# Patient Record
Sex: FEMALE | Race: WHITE | NOT HISPANIC OR LATINO | Employment: FULL TIME | URBAN - METROPOLITAN AREA
[De-identification: names, ages, dates, MRNs, and addresses within clinical notes are randomized per-mention and may not be internally consistent; named-entity substitution may affect disease eponyms.]

---

## 2018-01-16 RX ORDER — OXYCODONE AND ACETAMINOPHEN 5; 325 MG/1; MG/1
1-2 TABLET ORAL
COMMUNITY
Start: 2016-07-21 | End: 2024-06-30

## 2018-01-16 RX ORDER — ACETAMINOPHEN 500 MG
500-1000 TABLET ORAL
COMMUNITY

## 2018-01-16 RX ORDER — RISPERIDONE 2 MG/1
2 TABLET ORAL
COMMUNITY
Start: 2015-12-31 | End: 2024-06-30

## 2018-01-16 RX ORDER — HYDROXYZINE PAMOATE 25 MG/1
25 CAPSULE ORAL
COMMUNITY
Start: 2016-07-06 | End: 2024-06-30

## 2018-01-16 RX ORDER — ALBUTEROL SULFATE 90 UG/1
2 AEROSOL, METERED RESPIRATORY (INHALATION)
COMMUNITY
Start: 2016-01-12

## 2018-01-16 RX ORDER — LAMOTRIGINE 100 MG/1
100 TABLET ORAL
COMMUNITY
Start: 2016-07-21 | End: 2024-06-30

## 2018-01-16 RX ORDER — SUMATRIPTAN 25 MG/1
50 TABLET, FILM COATED ORAL
COMMUNITY
Start: 2016-07-21 | End: 2024-06-30

## 2018-01-16 RX ORDER — ONDANSETRON 4 MG/1
4 TABLET, ORALLY DISINTEGRATING ORAL
COMMUNITY
Start: 2016-07-16 | End: 2023-10-28

## 2023-10-27 ENCOUNTER — HOSPITAL ENCOUNTER (EMERGENCY)
Facility: CLINIC | Age: 28
Discharge: HOME | End: 2023-10-27
Attending: EMERGENCY MEDICINE | Admitting: EMERGENCY MEDICINE
Payer: COMMERCIAL

## 2023-10-27 VITALS
SYSTOLIC BLOOD PRESSURE: 114 MMHG | HEART RATE: 68 BPM | TEMPERATURE: 98.7 F | RESPIRATION RATE: 22 BRPM | BODY MASS INDEX: 22.2 KG/M2 | HEIGHT: 64 IN | DIASTOLIC BLOOD PRESSURE: 69 MMHG | OXYGEN SATURATION: 98 % | WEIGHT: 130 LBS

## 2023-10-27 DIAGNOSIS — R41.82 ALTERED MENTAL STATUS, UNSPECIFIED ALTERED MENTAL STATUS TYPE: ICD-10-CM

## 2023-10-27 LAB
ALBUMIN SERPL BCG-MCNC: 4.4 G/DL (ref 3.5–5.2)
ALP SERPL-CCNC: 56 U/L (ref 35–104)
ALT SERPL W P-5'-P-CCNC: 14 U/L (ref 0–50)
ANION GAP SERPL CALCULATED.3IONS-SCNC: 10 MMOL/L (ref 7–15)
APAP SERPL-MCNC: <5 UG/ML (ref 10–30)
AST SERPL W P-5'-P-CCNC: 16 U/L (ref 0–45)
ATRIAL RATE - MUSE: 123 BPM
BASOPHILS # BLD AUTO: 0 10E3/UL (ref 0–0.2)
BASOPHILS NFR BLD AUTO: 0 %
BILIRUB SERPL-MCNC: 0.4 MG/DL
BUN SERPL-MCNC: 7.8 MG/DL (ref 6–20)
CALCIUM SERPL-MCNC: 9.2 MG/DL (ref 8.6–10)
CHLORIDE SERPL-SCNC: 105 MMOL/L (ref 98–107)
CREAT SERPL-MCNC: 0.71 MG/DL (ref 0.51–0.95)
DEPRECATED HCO3 PLAS-SCNC: 24 MMOL/L (ref 22–29)
DIASTOLIC BLOOD PRESSURE - MUSE: NORMAL MMHG
EGFRCR SERPLBLD CKD-EPI 2021: >90 ML/MIN/1.73M2
EOSINOPHIL # BLD AUTO: 0.1 10E3/UL (ref 0–0.7)
EOSINOPHIL NFR BLD AUTO: 1 %
ERYTHROCYTE [DISTWIDTH] IN BLOOD BY AUTOMATED COUNT: 12.4 % (ref 10–15)
ETHANOL SERPL-MCNC: <0.01 G/DL
GLUCOSE SERPL-MCNC: 106 MG/DL (ref 70–99)
HCG SERPL QL: NEGATIVE
HCT VFR BLD AUTO: 33.2 % (ref 35–47)
HGB BLD-MCNC: 11.3 G/DL (ref 11.7–15.7)
IMM GRANULOCYTES # BLD: 0 10E3/UL
IMM GRANULOCYTES NFR BLD: 0 %
INTERPRETATION ECG - MUSE: NORMAL
LYMPHOCYTES # BLD AUTO: 2.3 10E3/UL (ref 0.8–5.3)
LYMPHOCYTES NFR BLD AUTO: 29 %
MCH RBC QN AUTO: 30.8 PG (ref 26.5–33)
MCHC RBC AUTO-ENTMCNC: 34 G/DL (ref 31.5–36.5)
MCV RBC AUTO: 91 FL (ref 78–100)
MONOCYTES # BLD AUTO: 0.6 10E3/UL (ref 0–1.3)
MONOCYTES NFR BLD AUTO: 7 %
NEUTROPHILS # BLD AUTO: 4.9 10E3/UL (ref 1.6–8.3)
NEUTROPHILS NFR BLD AUTO: 63 %
NRBC # BLD AUTO: 0 10E3/UL
NRBC BLD AUTO-RTO: 0 /100
P AXIS - MUSE: 59 DEGREES
PLATELET # BLD AUTO: 330 10E3/UL (ref 150–450)
POTASSIUM SERPL-SCNC: 3.8 MMOL/L (ref 3.4–5.3)
PR INTERVAL - MUSE: 156 MS
PROT SERPL-MCNC: 6.9 G/DL (ref 6.4–8.3)
QRS DURATION - MUSE: 78 MS
QT - MUSE: 298 MS
QTC - MUSE: 426 MS
R AXIS - MUSE: 31 DEGREES
RBC # BLD AUTO: 3.67 10E6/UL (ref 3.8–5.2)
SALICYLATES SERPL-MCNC: <0.3 MG/DL
SODIUM SERPL-SCNC: 139 MMOL/L (ref 135–145)
SYSTOLIC BLOOD PRESSURE - MUSE: NORMAL MMHG
T AXIS - MUSE: 47 DEGREES
VENTRICULAR RATE- MUSE: 123 BPM
WBC # BLD AUTO: 7.9 10E3/UL (ref 4–11)

## 2023-10-27 PROCEDURE — 36415 COLL VENOUS BLD VENIPUNCTURE: CPT | Performed by: EMERGENCY MEDICINE

## 2023-10-27 PROCEDURE — 85025 COMPLETE CBC W/AUTO DIFF WBC: CPT | Performed by: EMERGENCY MEDICINE

## 2023-10-27 PROCEDURE — 258N000003 HC RX IP 258 OP 636: Performed by: EMERGENCY MEDICINE

## 2023-10-27 PROCEDURE — 96374 THER/PROPH/DIAG INJ IV PUSH: CPT

## 2023-10-27 PROCEDURE — 80179 DRUG ASSAY SALICYLATE: CPT | Performed by: EMERGENCY MEDICINE

## 2023-10-27 PROCEDURE — 99284 EMERGENCY DEPT VISIT MOD MDM: CPT | Mod: 25

## 2023-10-27 PROCEDURE — 250N000011 HC RX IP 250 OP 636: Performed by: EMERGENCY MEDICINE

## 2023-10-27 PROCEDURE — 80143 DRUG ASSAY ACETAMINOPHEN: CPT | Performed by: EMERGENCY MEDICINE

## 2023-10-27 PROCEDURE — 96361 HYDRATE IV INFUSION ADD-ON: CPT

## 2023-10-27 PROCEDURE — 84703 CHORIONIC GONADOTROPIN ASSAY: CPT | Performed by: EMERGENCY MEDICINE

## 2023-10-27 PROCEDURE — 93005 ELECTROCARDIOGRAM TRACING: CPT

## 2023-10-27 PROCEDURE — 82077 ASSAY SPEC XCP UR&BREATH IA: CPT | Performed by: EMERGENCY MEDICINE

## 2023-10-27 PROCEDURE — 80053 COMPREHEN METABOLIC PANEL: CPT | Performed by: EMERGENCY MEDICINE

## 2023-10-27 RX ORDER — HALOPERIDOL 5 MG/ML
10 INJECTION INTRAMUSCULAR ONCE
Status: DISCONTINUED | OUTPATIENT
Start: 2023-10-27 | End: 2023-10-27

## 2023-10-27 RX ORDER — LORAZEPAM 2 MG/ML
1 INJECTION INTRAMUSCULAR ONCE
Status: COMPLETED | OUTPATIENT
Start: 2023-10-27 | End: 2023-10-27

## 2023-10-27 RX ADMIN — LORAZEPAM 1 MG: 2 INJECTION INTRAMUSCULAR; INTRAVENOUS at 02:40

## 2023-10-27 RX ADMIN — SODIUM CHLORIDE 1000 ML: 9 INJECTION, SOLUTION INTRAVENOUS at 05:27

## 2023-10-27 RX ADMIN — SODIUM CHLORIDE 1000 ML: 9 INJECTION, SOLUTION INTRAVENOUS at 02:37

## 2023-10-27 ASSESSMENT — ACTIVITIES OF DAILY LIVING (ADL)
ADLS_ACUITY_SCORE: 37
ADLS_ACUITY_SCORE: 37
ADLS_ACUITY_SCORE: 35
ADLS_ACUITY_SCORE: 37

## 2023-10-27 NOTE — ED TRIAGE NOTES
Patient states ate a really large marijuana cookie at 930pm.  States he made it really strong.  Also had 1000 mg of caffeine today.   States speech is slurred, left eye is droopy, neck & back hurts.  Feels like symptoms are coming on in waves.

## 2023-10-27 NOTE — ED PROVIDER NOTES
Discussed patient's presentation with her and with her boyfriend at the bedside.  Patient specifically denying any suicidal ideation or homicidal ideation, states that he does not have any active psychoses, and feels comfortable going home.  She states that she feels somewhat tired from the marijuana cookie, but also states that she feels comfortable at home.  No indication for mental assessment this time, patient is politely declined this anyway, and says that she does not feel she needs additional mental health resources in the outpatient setting.     Darshan Lewis MD  10/27/23 0943

## 2023-10-27 NOTE — ED PROVIDER NOTES
"  History     Chief Complaint:  Drug / Alcohol Assessment and Ingestion       HPI   Jenniffer Young is a 28 year old female with a history of marijuana dependence, anxiety, and depression who presents for a drug assessment. She states she ate a large marijuana cookie at 2130 tonight with her boyfriend, and also had 1000 mg of caffeine today to stay awake. She then began to feel anxious and complains of neck pain and feeling out of it. She lives with her mother and woke her up due to feeling this way.    Independent Historian:   None - Patient Only        Medications:    Albuterol  Vistaril  Lamictal  Metformin  Risperdal  Imitrex    Past Medical History:    Anxiety  Sepsis  Depression  Bipolar disorder  Psychosis  Marijuana dependence    Past Surgical History:    EGD  Tonsillectomy     Physical Exam   Patient Vitals for the past 24 hrs:   BP Temp Pulse Resp SpO2 Height Weight   10/27/23 0604 114/69 -- 68 22 98 % -- --   10/27/23 0534 101/61 -- 76 17 96 % -- --   10/27/23 0454 105/54 -- 89 22 95 % -- --   10/27/23 0300 -- -- 96 23 95 % -- --   10/27/23 0257 108/59 -- 101 23 94 % -- --   10/27/23 0242 126/70 -- 107 26 95 % -- --   10/27/23 0227 122/72 -- 116 21 96 % -- --   10/27/23 0142 135/82 98.7  F (37.1  C) (!) 141 16 97 % 1.626 m (5' 4\") 59 kg (130 lb)        Physical Exam  VS: Reviewed per above  HENT: Mucous membranes moist, no nuchal rigidity  EYES: sclera anicteric  CV: Rate as noted,  regular rhythm.   RESP: Effort normal. Breath sounds are normal bilaterally.  GI: no tenderness/rebound/guarding, not distended.  NEURO: GCS 15, moving all extremities equally  PSYCH: tearful at times, sleepy  MSK: No deformity of the extremities  SKIN: Warm and dry    Emergency Department Course   ECG  ECG taken at 0149, ECG read at 0157  Sinus tachycardia  Possible left atrial enlargement  Septal infarct, age undetermined  Abnormal ECG   No EKGs for comparison  Rate 123 bpm. HI interval 156 ms. QRS duration 78 ms. QT/QTc " 298/426 ms. P-R-T axes 59 31 47.     Laboratory:  Labs Ordered and Resulted from Time of ED Arrival to Time of ED Departure   COMPREHENSIVE METABOLIC PANEL - Abnormal       Result Value    Sodium 139      Potassium 3.8      Carbon Dioxide (CO2) 24      Anion Gap 10      Urea Nitrogen 7.8      Creatinine 0.71      GFR Estimate >90      Calcium 9.2      Chloride 105      Glucose 106 (*)     Alkaline Phosphatase 56      AST 16      ALT 14      Protein Total 6.9      Albumin 4.4      Bilirubin Total 0.4     ACETAMINOPHEN LEVEL - Abnormal    Acetaminophen <5.0 (*)    CBC WITH PLATELETS AND DIFFERENTIAL - Abnormal    WBC Count 7.9      RBC Count 3.67 (*)     Hemoglobin 11.3 (*)     Hematocrit 33.2 (*)     MCV 91      MCH 30.8      MCHC 34.0      RDW 12.4      Platelet Count 330      % Neutrophils 63      % Lymphocytes 29      % Monocytes 7      % Eosinophils 1      % Basophils 0      % Immature Granulocytes 0      NRBCs per 100 WBC 0      Absolute Neutrophils 4.9      Absolute Lymphocytes 2.3      Absolute Monocytes 0.6      Absolute Eosinophils 0.1      Absolute Basophils 0.0      Absolute Immature Granulocytes 0.0      Absolute NRBCs 0.0     SALICYLATE LEVEL - Normal    Salicylate <0.3     ETHYL ALCOHOL LEVEL - Normal    Alcohol ethyl <0.01     HCG QUALITATIVE PREGNANCY - Normal    hCG Serum Qualitative Negative          Emergency Department Course & Assessments:    Interventions:  Medications   LORazepam (ATIVAN) injection 1 mg (1 mg Intravenous $Given 10/27/23 0240)   sodium chloride 0.9% BOLUS 1,000 mL (0 mLs Intravenous Stopped 10/27/23 0344)   sodium chloride 0.9% BOLUS 1,000 mL (1,000 mLs Intravenous $New Bag 10/27/23 0527)        Assessments:  0211 I obtained history and examined the patient, as noted above.  0345 I rechecked and updated the patient.  0520 I rechecked and updated the patient. She is still tachycardic.    Independent Interpretation (X-rays, CTs, rhythm strip):  None    Consultations/Discussion of  Management or Tests:  None        Social Determinants of Health affecting care:   Stress/Adjustment Disorders    Disposition:  Care of the patient was transferred to my colleague Dr. Lewis pending reevaluation.     Impression & Plan      Medical Decision Making:  Patient presents to the ER for evaluation of altered mental status in the setting of THC cookie and significant caffeine ingestion.  On arrival she is tachycardic.  She is awake and alert without focal neuro deficit but is  sleepy and intermittently tearful.  Overdose labs are reassuring.  This juncture, suspect alteration in mental status is related to substance ingestion rather than other occult infectious or sinister metabolic or intracranial pathology.  At signout to my colleague, patient was still clearing with plans for reassessment later in the morning to determine need for any further evaluation or mental health assessment.    Diagnosis:    ICD-10-CM    1. Altered mental status, unspecified altered mental status type  R41.82            Discharge Medications:  New Prescriptions    No medications on file          Scribe Disclosure:  Regulo WALKER, am serving as a scribe at 2:15 AM on 10/27/2023 to document services personally performed by Harry Foreman MD based on my observations and the provider's statements to me.   10/27/2023   Harry Foreman MD Lindenbaum, Elan, MD  10/27/23 0710       Harry Foreman MD  10/27/23 0726

## 2023-10-28 ENCOUNTER — HOSPITAL ENCOUNTER (EMERGENCY)
Facility: CLINIC | Age: 28
Discharge: HOME | End: 2023-10-28
Attending: EMERGENCY MEDICINE | Admitting: EMERGENCY MEDICINE
Payer: COMMERCIAL

## 2023-10-28 VITALS
DIASTOLIC BLOOD PRESSURE: 82 MMHG | SYSTOLIC BLOOD PRESSURE: 125 MMHG | TEMPERATURE: 98.2 F | HEART RATE: 78 BPM | OXYGEN SATURATION: 98 % | RESPIRATION RATE: 16 BRPM

## 2023-10-28 DIAGNOSIS — R11.2 NAUSEA AND VOMITING, UNSPECIFIED VOMITING TYPE: ICD-10-CM

## 2023-10-28 DIAGNOSIS — R51.9 ACUTE NONINTRACTABLE HEADACHE, UNSPECIFIED HEADACHE TYPE: ICD-10-CM

## 2023-10-28 DIAGNOSIS — F12.90 MARIJUANA USE: ICD-10-CM

## 2023-10-28 LAB
ANION GAP SERPL CALCULATED.3IONS-SCNC: 9 MMOL/L (ref 7–15)
BASOPHILS # BLD AUTO: 0 10E3/UL (ref 0–0.2)
BASOPHILS NFR BLD AUTO: 0 %
BUN SERPL-MCNC: 6.8 MG/DL (ref 6–20)
CALCIUM SERPL-MCNC: 8.8 MG/DL (ref 8.6–10)
CHLORIDE SERPL-SCNC: 107 MMOL/L (ref 98–107)
CREAT SERPL-MCNC: 0.64 MG/DL (ref 0.51–0.95)
DEPRECATED HCO3 PLAS-SCNC: 24 MMOL/L (ref 22–29)
EGFRCR SERPLBLD CKD-EPI 2021: >90 ML/MIN/1.73M2
EOSINOPHIL # BLD AUTO: 0 10E3/UL (ref 0–0.7)
EOSINOPHIL NFR BLD AUTO: 0 %
ERYTHROCYTE [DISTWIDTH] IN BLOOD BY AUTOMATED COUNT: 12.7 % (ref 10–15)
GLUCOSE SERPL-MCNC: 92 MG/DL (ref 70–99)
HCG SERPL QL: NEGATIVE
HCT VFR BLD AUTO: 34.3 % (ref 35–47)
HGB BLD-MCNC: 11.4 G/DL (ref 11.7–15.7)
IMM GRANULOCYTES # BLD: 0 10E3/UL
IMM GRANULOCYTES NFR BLD: 0 %
LYMPHOCYTES # BLD AUTO: 1.5 10E3/UL (ref 0.8–5.3)
LYMPHOCYTES NFR BLD AUTO: 27 %
MCH RBC QN AUTO: 30.8 PG (ref 26.5–33)
MCHC RBC AUTO-ENTMCNC: 33.2 G/DL (ref 31.5–36.5)
MCV RBC AUTO: 93 FL (ref 78–100)
MONOCYTES # BLD AUTO: 0.3 10E3/UL (ref 0–1.3)
MONOCYTES NFR BLD AUTO: 5 %
NEUTROPHILS # BLD AUTO: 3.8 10E3/UL (ref 1.6–8.3)
NEUTROPHILS NFR BLD AUTO: 68 %
NRBC # BLD AUTO: 0 10E3/UL
NRBC BLD AUTO-RTO: 0 /100
PLATELET # BLD AUTO: 306 10E3/UL (ref 150–450)
POTASSIUM SERPL-SCNC: 4.2 MMOL/L (ref 3.4–5.3)
RBC # BLD AUTO: 3.7 10E6/UL (ref 3.8–5.2)
SODIUM SERPL-SCNC: 140 MMOL/L (ref 135–145)
WBC # BLD AUTO: 5.6 10E3/UL (ref 4–11)

## 2023-10-28 PROCEDURE — 85025 COMPLETE CBC W/AUTO DIFF WBC: CPT | Performed by: EMERGENCY MEDICINE

## 2023-10-28 PROCEDURE — 96376 TX/PRO/DX INJ SAME DRUG ADON: CPT

## 2023-10-28 PROCEDURE — 96374 THER/PROPH/DIAG INJ IV PUSH: CPT

## 2023-10-28 PROCEDURE — 250N000011 HC RX IP 250 OP 636: Mod: JZ | Performed by: EMERGENCY MEDICINE

## 2023-10-28 PROCEDURE — 84703 CHORIONIC GONADOTROPIN ASSAY: CPT | Performed by: EMERGENCY MEDICINE

## 2023-10-28 PROCEDURE — 80048 BASIC METABOLIC PNL TOTAL CA: CPT | Performed by: EMERGENCY MEDICINE

## 2023-10-28 PROCEDURE — 96375 TX/PRO/DX INJ NEW DRUG ADDON: CPT

## 2023-10-28 PROCEDURE — 258N000003 HC RX IP 258 OP 636: Performed by: EMERGENCY MEDICINE

## 2023-10-28 PROCEDURE — 96361 HYDRATE IV INFUSION ADD-ON: CPT

## 2023-10-28 PROCEDURE — 36415 COLL VENOUS BLD VENIPUNCTURE: CPT | Performed by: EMERGENCY MEDICINE

## 2023-10-28 PROCEDURE — 99284 EMERGENCY DEPT VISIT MOD MDM: CPT | Mod: 25

## 2023-10-28 RX ORDER — ONDANSETRON 2 MG/ML
4 INJECTION INTRAMUSCULAR; INTRAVENOUS ONCE
Status: COMPLETED | OUTPATIENT
Start: 2023-10-28 | End: 2023-10-28

## 2023-10-28 RX ORDER — KETOROLAC TROMETHAMINE 15 MG/ML
15 INJECTION, SOLUTION INTRAMUSCULAR; INTRAVENOUS ONCE
Status: COMPLETED | OUTPATIENT
Start: 2023-10-28 | End: 2023-10-28

## 2023-10-28 RX ORDER — ONDANSETRON 4 MG/1
4 TABLET, ORALLY DISINTEGRATING ORAL EVERY 8 HOURS PRN
Qty: 10 TABLET | Refills: 0 | Status: SHIPPED | OUTPATIENT
Start: 2023-10-28 | End: 2024-08-29

## 2023-10-28 RX ADMIN — ONDANSETRON 4 MG: 2 INJECTION INTRAMUSCULAR; INTRAVENOUS at 17:04

## 2023-10-28 RX ADMIN — ONDANSETRON 4 MG: 2 INJECTION INTRAMUSCULAR; INTRAVENOUS at 13:56

## 2023-10-28 RX ADMIN — SODIUM CHLORIDE 1000 ML: 9 INJECTION, SOLUTION INTRAVENOUS at 13:56

## 2023-10-28 RX ADMIN — KETOROLAC TROMETHAMINE 15 MG: 15 INJECTION, SOLUTION INTRAMUSCULAR; INTRAVENOUS at 17:04

## 2023-10-28 ASSESSMENT — ACTIVITIES OF DAILY LIVING (ADL): ADLS_ACUITY_SCORE: 33

## 2023-10-28 NOTE — ED PROVIDER NOTES
"  History     Chief Complaint:  Vomiting and Addiction Problem       HPI   Jenniffer Young is a 28 year old female who presents with ongoing symptoms of marijuana toxicity.  She was seen by my colleague Dr. Lewis yesterday after she had consumed a large cookie that had a extensive amount of marijuana in it.  She had typical symptoms of paranoia and tachycardia.  However, her labs are reassuring and she was discharged home with a diagnosis of marijuana intoxication.    The patient states that she is feeling less euphoric today.  However, she now has more of a headache and nausea.  She also notes that she has had some intermittent tingling to her arms and face and via research through [employer], she has become concerned that she may have suffered a stroke during this.  Unfortunately, there was a long wait to be seen in the emergency department today.  She had labs drawn by the triage team and Zofran was given.  By the time I saw her, she notes that her symptoms are mostly improving.  She describes an ongoing mild to moderate headache, ongoing nausea without vomiting, and generalized weakness.      Independent Historian:   None - Patient Only    Review of External Notes:   Yes-I reviewed her visit to this emergency department yesterday.      Medications:    ondansetron (ZOFRAN ODT) 4 MG ODT tab  acetaminophen (TYLENOL) 500 MG tablet  albuterol (PROAIR HFA/PROVENTIL HFA/VENTOLIN HFA) 108 (90 BASE) MCG/ACT Inhaler  hydrOXYzine (VISTARIL) 25 MG capsule  lamoTRIgine (LAMICTAL) 100 MG tablet  metFORMIN (GLUCOPHAGE) 1000 MG tablet  oxyCODONE-acetaminophen (PERCOCET) 5-325 MG per tablet  risperiDONE (RISPERDAL) 2 MG tablet  SUMAtriptan (IMITREX) 25 MG tablet        Past Medical History:    Past Medical History:   Diagnosis Date    Anxiety disorder        Past Surgical History:    Past Surgical History:   Procedure Laterality Date    ESOPHAGOSCOPY, GASTROSCOPY, DUODENOSCOPY (EGD), COMBINED      2010 - found \"bile\" in " stomach.    OTHER SURGICAL HISTORY      [id number],NO PREVIOUS SURGERY    OTHER SURGICAL HISTORY      12/14/2015,27921.0,IA LAP DIAGNOSTIC ABDOMEN    TONSILLECTOMY      No Comments Provided        Physical Exam   Patient Vitals for the past 24 hrs:   BP Temp Temp src Pulse Resp SpO2   10/28/23 1339 126/70 98.2  F (36.8  C) Temporal 77 18 98 %        Physical Exam  Eye:  Pupils are equal, round, and reactive.  Extraocular movements intact.    ENT:  No rhinorrhea.  Moist mucus membranes.  Normal tongue and tonsil.    Cardiac:  Regular rate and rhythm.  No murmurs, gallops, or rubs.    Pulmonary:  Clear to auscultation bilaterally.  No wheezes, rales, or rhonchi.    Abdomen:  Positive bowel sounds.  Abdomen is soft and non-distended, without focal tenderness.    Musculoskeletal:  Normal movement of all extremities without evidence for deficit.    Skin:  Warm and dry without rashes.    Neurologic:  CN II - XII intact.  5/5 strength in all extremities.  Normal sensation throughout.  Normal finger to nose and heel to shin.  2+ patellar reflexes.  Normal gait.    Psychiatric:  Normal affect with appropriate interaction with examiner.      Emergency Department Course     Laboratory:  Labs Ordered and Resulted from Time of ED Arrival to Time of ED Departure   CBC WITH PLATELETS AND DIFFERENTIAL - Abnormal       Result Value    WBC Count 5.6      RBC Count 3.70 (*)     Hemoglobin 11.4 (*)     Hematocrit 34.3 (*)     MCV 93      MCH 30.8      MCHC 33.2      RDW 12.7      Platelet Count 306      % Neutrophils 68      % Lymphocytes 27      % Monocytes 5      % Eosinophils 0      % Basophils 0      % Immature Granulocytes 0      NRBCs per 100 WBC 0      Absolute Neutrophils 3.8      Absolute Lymphocytes 1.5      Absolute Monocytes 0.3      Absolute Eosinophils 0.0      Absolute Basophils 0.0      Absolute Immature Granulocytes 0.0      Absolute NRBCs 0.0     BASIC METABOLIC PANEL - Normal    Sodium 140      Potassium 4.2       "Chloride 107      Carbon Dioxide (CO2) 24      Anion Gap 9      Urea Nitrogen 6.8      Creatinine 0.64      GFR Estimate >90      Calcium 8.8      Glucose 92     HCG QUALITATIVE PREGNANCY - Normal    hCG Serum Qualitative Negative            Emergency Department Course & Assessments:      Interventions:  Medications   ondansetron (ZOFRAN) injection 4 mg (4 mg Intravenous $Given 10/28/23 1356)   sodium chloride 0.9% BOLUS 1,000 mL (0 mLs Intravenous Stopped 10/28/23 1536)   ondansetron (ZOFRAN) injection 4 mg (4 mg Intravenous $Given 10/28/23 1704)   ketorolac (TORADOL) injection 15 mg (15 mg Intravenous $Given 10/28/23 1704)      Independent Interpretation (X-rays, CTs, rhythm strip):  None    Consultations/Discussion of Management or Tests:  None        Social Determinants of Health affecting care:   None    Disposition:  The patient was discharged to home.     Impression & Plan        Medical Decision Making:  This young healthy woman presents to us for a repeat visit after a large ingestion of marijuana yesterday.  She was seen by my colleague yesterday with a negative work-up.  Repeat laboratory investigation continues to be normal today.  The patient admits that she was somewhat \"freaked out\" while high on marijuana and was very worried about intermittent tingling in her arms and difficulty with her speech at times.  All of this is since resolved.  She has a normal neurologic exam here.  I see no concerns here for this representing TIA or stroke and advanced imaging is not indicated.  Patient continues to have a mild headache which was treated with Toradol and her nausea was treated with Zofran.  The patient feels reassured and is comfort with the plan for discharge home.  We discussed the dangers of marijuana use and to avoid ingesting large amounts.  She will otherwise follow-up with her primary team next week with immediate return to us for worsening of condition or other emergent concerns.      Diagnosis:   "  ICD-10-CM    1. Acute nonintractable headache, unspecified headache type  R51.9       2. Nausea and vomiting, unspecified vomiting type  R11.2       3. Marijuana use  F12.90            Discharge Medications:  New Prescriptions    ONDANSETRON (ZOFRAN ODT) 4 MG ODT TAB    Take 1 tablet (4 mg) by mouth every 8 hours as needed for nausea          Chad A. Trierweiler, MD  10/28/2023   Trierweiler, Chad A, MD Trierweiler, Chad A, MD  10/28/23 1948

## 2023-10-28 NOTE — ED TRIAGE NOTES
Vomiting and dizziness continues to be ongoing since yesterday.  Was worked up yesterday for same.  No focal deficits on arrival.  Ambulated into triage.        Triage Assessment (Adult)       Row Name 10/28/23 1222          Triage Assessment    Airway WDL WDL        Respiratory WDL    Respiratory WDL WDL        Skin Circulation/Temperature WDL    Skin Circulation/Temperature WDL WDL        Cardiac WDL    Cardiac WDL WDL        Peripheral/Neurovascular WDL    Peripheral Neurovascular WDL WDL        Cognitive/Neuro/Behavioral WDL    Cognitive/Neuro/Behavioral WDL WDL

## 2023-10-28 NOTE — ED TRIAGE NOTES
Pt presents to ED with weakness, headache, dizziness, and nausea.  Pt recently seen at [facility] ED for altered mental status secondary to cannabis use.  Pt reports cannabis use since discharge.     Triage Assessment (Adult)       Row Name 10/28/23 1340 10/28/23 1222       Triage Assessment    Airway WDL WDL WDL       Respiratory WDL    Respiratory WDL WDL WDL       Skin Circulation/Temperature WDL    Skin Circulation/Temperature WDL WDL WDL       Cardiac WDL    Cardiac WDL WDL WDL       Peripheral/Neurovascular WDL    Peripheral Neurovascular WDL WDL WDL       Cognitive/Neuro/Behavioral WDL    Cognitive/Neuro/Behavioral WDL WDL WDL

## 2024-05-30 ENCOUNTER — ANCILLARY PROCEDURE (OUTPATIENT)
Dept: GENERAL RADIOLOGY | Facility: CLINIC | Age: 29
End: 2024-05-30
Attending: PHYSICIAN ASSISTANT
Payer: COMMERCIAL

## 2024-05-30 ENCOUNTER — OFFICE VISIT (OUTPATIENT)
Dept: URGENT CARE | Facility: URGENT CARE | Age: 29
End: 2024-05-30
Payer: COMMERCIAL

## 2024-05-30 VITALS
DIASTOLIC BLOOD PRESSURE: 73 MMHG | SYSTOLIC BLOOD PRESSURE: 96 MMHG | HEART RATE: 77 BPM | OXYGEN SATURATION: 100 % | RESPIRATION RATE: 16 BRPM | TEMPERATURE: 97.7 F

## 2024-05-30 DIAGNOSIS — J45.901 EXACERBATION OF REACTIVE AIRWAY DISEASE, UNSPECIFIED ASTHMA SEVERITY, UNSPECIFIED WHETHER PERSISTENT: Primary | ICD-10-CM

## 2024-05-30 DIAGNOSIS — J45.901 EXACERBATION OF REACTIVE AIRWAY DISEASE, UNSPECIFIED ASTHMA SEVERITY, UNSPECIFIED WHETHER PERSISTENT: ICD-10-CM

## 2024-05-30 DIAGNOSIS — R06.02 SOB (SHORTNESS OF BREATH): ICD-10-CM

## 2024-05-30 LAB
ALBUMIN SERPL-MCNC: 4.4 G/DL (ref 3.4–5)
ALP SERPL-CCNC: 61 U/L (ref 40–150)
ALT SERPL W P-5'-P-CCNC: 21 U/L (ref 0–50)
ANION GAP SERPL CALCULATED.3IONS-SCNC: 10 MMOL/L (ref 3–14)
AST SERPL W P-5'-P-CCNC: 21 U/L (ref 0–45)
BILIRUB SERPL-MCNC: 0.9 MG/DL (ref 0.2–1.3)
BUN SERPL-MCNC: 12 MG/DL (ref 7–30)
CALCIUM SERPL-MCNC: 10 MG/DL (ref 8.5–10.1)
CHLORIDE BLD-SCNC: 110 MMOL/L (ref 94–109)
CO2 SERPL-SCNC: 28 MMOL/L (ref 20–32)
CREAT SERPL-MCNC: 0.9 MG/DL (ref 0.52–1.04)
D DIMER PPP FEU-MCNC: 0.34 UG/ML FEU (ref 0–0.5)
EGFRCR SERPLBLD CKD-EPI 2021: 88 ML/MIN/1.73M2
ERYTHROCYTE [DISTWIDTH] IN BLOOD BY AUTOMATED COUNT: 12.3 % (ref 10–15)
GLUCOSE BLD-MCNC: 82 MG/DL (ref 70–99)
HCT VFR BLD AUTO: 41.6 % (ref 35–47)
HGB BLD-MCNC: 14 G/DL (ref 11.7–15.7)
MCH RBC QN AUTO: 30.4 PG (ref 26.5–33)
MCHC RBC AUTO-ENTMCNC: 33.7 G/DL (ref 31.5–36.5)
MCV RBC AUTO: 90 FL (ref 78–100)
PLATELET # BLD AUTO: 290 10E3/UL (ref 150–450)
POTASSIUM BLD-SCNC: 4.5 MMOL/L (ref 3.4–5.3)
PROT SERPL-MCNC: 8.2 G/DL (ref 6.8–8.8)
RBC # BLD AUTO: 4.6 10E6/UL (ref 3.8–5.2)
SODIUM SERPL-SCNC: 148 MMOL/L (ref 135–145)
WBC # BLD AUTO: 6.8 10E3/UL (ref 4–11)

## 2024-05-30 PROCEDURE — 71046 X-RAY EXAM CHEST 2 VIEWS: CPT | Mod: TC | Performed by: RADIOLOGY

## 2024-05-30 PROCEDURE — 80053 COMPREHEN METABOLIC PANEL: CPT | Performed by: PHYSICIAN ASSISTANT

## 2024-05-30 PROCEDURE — 85027 COMPLETE CBC AUTOMATED: CPT | Performed by: PHYSICIAN ASSISTANT

## 2024-05-30 PROCEDURE — 36415 COLL VENOUS BLD VENIPUNCTURE: CPT | Performed by: PHYSICIAN ASSISTANT

## 2024-05-30 PROCEDURE — 85379 FIBRIN DEGRADATION QUANT: CPT | Performed by: PHYSICIAN ASSISTANT

## 2024-05-30 PROCEDURE — 99204 OFFICE O/P NEW MOD 45 MIN: CPT | Mod: 25 | Performed by: PHYSICIAN ASSISTANT

## 2024-05-30 PROCEDURE — 94640 AIRWAY INHALATION TREATMENT: CPT | Performed by: PHYSICIAN ASSISTANT

## 2024-05-30 RX ORDER — HALOPERIDOL 2 MG/1
4 TABLET ORAL DAILY
COMMUNITY
Start: 2024-05-17

## 2024-05-30 RX ORDER — FLUTICASONE PROPIONATE 220 UG/1
1 AEROSOL, METERED RESPIRATORY (INHALATION) 2 TIMES DAILY
Qty: 12 G | Refills: 1 | Status: SHIPPED | OUTPATIENT
Start: 2024-05-30

## 2024-05-30 RX ORDER — ALBUTEROL SULFATE 90 UG/1
2 AEROSOL, METERED RESPIRATORY (INHALATION) EVERY 6 HOURS
Qty: 18 G | Refills: 1 | Status: SHIPPED | OUTPATIENT
Start: 2024-05-30

## 2024-05-30 RX ORDER — ALBUTEROL SULFATE 0.83 MG/ML
2.5 SOLUTION RESPIRATORY (INHALATION) ONCE
Status: COMPLETED | OUTPATIENT
Start: 2024-05-30 | End: 2024-05-30

## 2024-05-30 RX ORDER — PREDNISONE 20 MG/1
20 TABLET ORAL 2 TIMES DAILY
Qty: 10 TABLET | Refills: 0 | Status: SHIPPED | OUTPATIENT
Start: 2024-05-30

## 2024-05-30 RX ADMIN — ALBUTEROL SULFATE 2.5 MG: 0.83 SOLUTION RESPIRATORY (INHALATION) at 13:35

## 2024-05-30 NOTE — PROGRESS NOTES
Assessment & Plan     Exacerbation of reactive airway disease, unspecified asthma severity, unspecified whether persistent    Albuterol neb given to patient in room  This only helped a little bit  Chest xray Negative for acute findings, read by Ralf ESCOBAR at time of visit.    Due to having this in the past and having this reoccur at times I have started her on flovent  Start on prednisone and albuterol  Follow up with PCP    Reactive airway disease is a breathing problem that appears as wheezing, a whistling noise in your airways. It may be caused by a viral or bacterial infection, allergies, tobacco smoke, or something else in the environment. When you are around these triggers, your body releases chemicals that make the airways get tight.  Reactive airway disease is a lot like asthma. Both can cause wheezing. But asthma is ongoing, while reactive airway disease may occur only now and then. Tests can be done to tell whether you have asthma. You may take the same medicines used to treat asthma    - albuterol (PROVENTIL) neb solution 2.5 mg  - INHALATION/NEBULIZER TREATMENT, INITIAL  - XR Chest 2 Views; Future  - albuterol (PROVENTIL HFA) 108 (90 Base) MCG/ACT inhaler; Inhale 2 puffs into the lungs every 6 hours  - fluticasone (FLOVENT HFA) 220 MCG/ACT inhaler; Inhale 1 puff into the lungs 2 times daily    SOB (shortness of breath)    Appears to be exacerbation of reactive airway disease    Will treat with albuterol neb treatment  Chest xray Negative for acute findings, read by Ralf ESCOBAR at time of visit.    Start on prednisone  Start on flovent  Start on albuterol    - albuterol (PROVENTIL) neb solution 2.5 mg  - INHALATION/NEBULIZER TREATMENT, INITIAL  - XR Chest 2 Views; Future    - predniSONE (DELTASONE) 20 MG tablet; Take 1 tablet (20 mg) by mouth 2 times daily      Nicotine/Tobacco Cessation  She reports that she has been smoking vaping device. She has never used smokeless  tobacco.  Nicotine/Tobacco Cessation Plan      At today's visit with Jenniffer Young , we discussed results, diagnosis, medications and formulated a plan.  We also discussed red flags for immediate return to clinic/ER, as well as indications for follow up with PCP if not improved in 3 days. Patient understood and agreed to plan. Jenniffer Young was discharged with stable vitals and has no further questions.       No follow-ups on file.    Subjective   Jenniffer is a 29 year old, presenting for the following health issues:  Shortness of Breath (Today, not dx with asthma but father has it, usually happens with physical activity or laughing hard)    HPI     Review of Systems  Constitutional, neuro, ENT, endocrine, pulmonary, cardiac, gastrointestinal, genitourinary, musculoskeletal, integument and psychiatric systems are negative, except as otherwise noted.      Objective    BP 96/73   Pulse 77   Temp 97.7  F (36.5  C)   Resp 16   SpO2 100%   There is no height or weight on file to calculate BMI.  Physical Exam   GENERAL: alert and no distress  EYES: Eyes grossly normal to inspection, PERRL and conjunctivae and sclerae normal  HENT: ear canals and TM's normal, nose and mouth without ulcers or lesions  NECK: no adenopathy, no asymmetry, masses, or scars  RESP: lungs clear to auscultation - no rales, rhonchi or wheezes  CV: regular rate and rhythm, normal S1 S2, no S3 or S4, no murmur, click or rub, no peripheral edema  ABDOMEN: soft, nontender, no hepatosplenomegaly, no masses and bowel sounds normal  MS: no gross musculoskeletal defects noted, no edema  SKIN: no suspicious lesions or rashes  NEURO: Normal strength and tone, mentation intact and speech normal  PSYCH: mentation appears normal, affect normal/bright    CXR - Reviewed and interpreted by me Normal- no infiltrates, effusions, pneumothoraces, cardiomegaly or masses    Results for orders placed or performed in visit on 05/30/24   XR Chest 2 Views     Status:  None    Narrative    XR CHEST 2 VIEWS 5/30/2024 2:06 PM    HISTORY: Exacerbation of reactive airway disease, unspecified asthma  severity, unspecified whether persistent; SOB (shortness of breath)    COMPARISON: None.      Impression    IMPRESSION: Normal.    KELY FLORES MD         SYSTEM ID:  [id number]   Results for orders placed or performed in visit on 05/30/24   D dimer quantitative     Status: Normal   Result Value Ref Range    D-Dimer Quantitative 0.34 0.00 - 0.50 ug/mL FEU    Narrative    This D-dimer assay is intended for use in conjunction with a clinical pretest probability assessment model to exclude pulmonary embolism (PE) and deep venous thrombosis (DVT) in outpatients suspected of PE or DVT. The cut-off value is 0.50 ug/mL FEU.   CBC with platelets     Status: Normal   Result Value Ref Range    WBC Count 6.8 4.0 - 11.0 10e3/uL    RBC Count 4.60 3.80 - 5.20 10e6/uL    Hemoglobin 14.0 11.7 - 15.7 g/dL    Hematocrit 41.6 35.0 - 47.0 %    MCV 90 78 - 100 fL    MCH 30.4 26.5 - 33.0 pg    MCHC 33.7 31.5 - 36.5 g/dL    RDW 12.3 10.0 - 15.0 %    Platelet Count 290 150 - 450 10e3/uL   Comprehensive metabolic panel (BMP + Alb, Alk Phos, ALT, AST, Total. Bili, TP)     Status: Abnormal   Result Value Ref Range    Sodium 148 (H) 135 - 145 mmol/L    Potassium 4.5 3.4 - 5.3 mmol/L    Chloride 110 (H) 94 - 109 mmol/L    Carbon Dioxide (CO2) 28 20 - 32 mmol/L    Anion Gap 10 3 - 14 mmol/L    Urea Nitrogen 12 7 - 30 mg/dL    Creatinine 0.90 0.52 - 1.04 mg/dL    GFR Estimate 88 >60 mL/min/1.73m2    Calcium 10.0 8.5 - 10.1 mg/dL    Glucose 82 70 - 99 mg/dL    Alkaline Phosphatase 61 40 - 150 U/L    AST 21 0 - 45 U/L    ALT 21 0 - 50 U/L    Protein Total 8.2 6.8 - 8.8 g/dL    Albumin 4.4 3.4 - 5.0 g/dL    Bilirubin Total 0.9 0.2 - 1.3 mg/dL             Signed Electronically by: Ralf Wright, SHAWN, SAL

## 2024-05-30 NOTE — LETTER
May 30, 2024      Jenniffer Young  [address] [facility] [address]        To Whom It May Concern:    Jenniffer Young  was seen in our clinic on 5/30/2024 for asthma like symptoms. Pt had blood work, X-ray and a nebulizer treatment, please excuse her due to illness.        Sincerely,        Ralf Wright, [facility], PA-C

## 2024-06-26 ENCOUNTER — HOSPITAL ENCOUNTER (EMERGENCY)
Facility: CLINIC | Age: 29
Discharge: HOME | End: 2024-06-26
Attending: EMERGENCY MEDICINE | Admitting: EMERGENCY MEDICINE
Payer: COMMERCIAL

## 2024-06-26 ENCOUNTER — APPOINTMENT (OUTPATIENT)
Dept: ULTRASOUND IMAGING | Facility: CLINIC | Age: 29
End: 2024-06-26
Attending: EMERGENCY MEDICINE
Payer: COMMERCIAL

## 2024-06-26 ENCOUNTER — NURSE TRIAGE (OUTPATIENT)
Dept: NURSING | Facility: CLINIC | Age: 29
End: 2024-06-26
Payer: COMMERCIAL

## 2024-06-26 VITALS
SYSTOLIC BLOOD PRESSURE: 105 MMHG | TEMPERATURE: 98.3 F | DIASTOLIC BLOOD PRESSURE: 65 MMHG | OXYGEN SATURATION: 100 % | HEART RATE: 59 BPM | WEIGHT: 130 LBS | BODY MASS INDEX: 22.2 KG/M2 | RESPIRATION RATE: 18 BRPM | HEIGHT: 64 IN

## 2024-06-26 DIAGNOSIS — F19.10 SUBSTANCE ABUSE (H): ICD-10-CM

## 2024-06-26 DIAGNOSIS — R10.13 ABDOMINAL PAIN, EPIGASTRIC: Primary | ICD-10-CM

## 2024-06-26 LAB
ALBUMIN SERPL BCG-MCNC: 4 G/DL (ref 3.5–5.2)
ALP SERPL-CCNC: 56 U/L (ref 40–150)
ALT SERPL W P-5'-P-CCNC: 10 U/L (ref 0–50)
ANION GAP SERPL CALCULATED.3IONS-SCNC: 7 MMOL/L (ref 7–15)
AST SERPL W P-5'-P-CCNC: 11 U/L (ref 0–45)
BASOPHILS # BLD AUTO: 0 10E3/UL (ref 0–0.2)
BASOPHILS NFR BLD AUTO: 0 %
BILIRUB SERPL-MCNC: 0.2 MG/DL
BUN SERPL-MCNC: 9.3 MG/DL (ref 6–20)
CALCIUM SERPL-MCNC: 9.1 MG/DL (ref 8.6–10)
CHLORIDE SERPL-SCNC: 106 MMOL/L (ref 98–107)
CREAT SERPL-MCNC: 0.79 MG/DL (ref 0.51–0.95)
DEPRECATED HCO3 PLAS-SCNC: 28 MMOL/L (ref 22–29)
EGFRCR SERPLBLD CKD-EPI 2021: >90 ML/MIN/1.73M2
EOSINOPHIL # BLD AUTO: 0.2 10E3/UL (ref 0–0.7)
EOSINOPHIL NFR BLD AUTO: 4 %
ERYTHROCYTE [DISTWIDTH] IN BLOOD BY AUTOMATED COUNT: 12.3 % (ref 10–15)
GLUCOSE SERPL-MCNC: 106 MG/DL (ref 70–99)
HCG SERPL QL: NEGATIVE
HCT VFR BLD AUTO: 33.1 % (ref 35–47)
HGB BLD-MCNC: 11 G/DL (ref 11.7–15.7)
IMM GRANULOCYTES # BLD: 0 10E3/UL
IMM GRANULOCYTES NFR BLD: 0 %
LIPASE SERPL-CCNC: 18 U/L (ref 13–60)
LYMPHOCYTES # BLD AUTO: 3 10E3/UL (ref 0.8–5.3)
LYMPHOCYTES NFR BLD AUTO: 49 %
MCH RBC QN AUTO: 30.6 PG (ref 26.5–33)
MCHC RBC AUTO-ENTMCNC: 33.2 G/DL (ref 31.5–36.5)
MCV RBC AUTO: 92 FL (ref 78–100)
MONOCYTES # BLD AUTO: 0.5 10E3/UL (ref 0–1.3)
MONOCYTES NFR BLD AUTO: 8 %
NEUTROPHILS # BLD AUTO: 2.4 10E3/UL (ref 1.6–8.3)
NEUTROPHILS NFR BLD AUTO: 38 %
NRBC # BLD AUTO: 0 10E3/UL
NRBC BLD AUTO-RTO: 0 /100
PLATELET # BLD AUTO: 228 10E3/UL (ref 150–450)
POTASSIUM SERPL-SCNC: 4 MMOL/L (ref 3.4–5.3)
PROT SERPL-MCNC: 6.1 G/DL (ref 6.4–8.3)
RBC # BLD AUTO: 3.59 10E6/UL (ref 3.8–5.2)
SODIUM SERPL-SCNC: 141 MMOL/L (ref 135–145)
WBC # BLD AUTO: 6.2 10E3/UL (ref 4–11)

## 2024-06-26 PROCEDURE — 250N000009 HC RX 250: Performed by: EMERGENCY MEDICINE

## 2024-06-26 PROCEDURE — 96360 HYDRATION IV INFUSION INIT: CPT

## 2024-06-26 PROCEDURE — 36415 COLL VENOUS BLD VENIPUNCTURE: CPT | Performed by: EMERGENCY MEDICINE

## 2024-06-26 PROCEDURE — 258N000003 HC RX IP 258 OP 636: Performed by: EMERGENCY MEDICINE

## 2024-06-26 PROCEDURE — 80053 COMPREHEN METABOLIC PANEL: CPT | Performed by: EMERGENCY MEDICINE

## 2024-06-26 PROCEDURE — 76705 ECHO EXAM OF ABDOMEN: CPT

## 2024-06-26 PROCEDURE — 85041 AUTOMATED RBC COUNT: CPT | Performed by: EMERGENCY MEDICINE

## 2024-06-26 PROCEDURE — 84703 CHORIONIC GONADOTROPIN ASSAY: CPT | Performed by: EMERGENCY MEDICINE

## 2024-06-26 PROCEDURE — 250N000013 HC RX MED GY IP 250 OP 250 PS 637: Performed by: EMERGENCY MEDICINE

## 2024-06-26 PROCEDURE — 96361 HYDRATE IV INFUSION ADD-ON: CPT

## 2024-06-26 PROCEDURE — 83690 ASSAY OF LIPASE: CPT | Performed by: EMERGENCY MEDICINE

## 2024-06-26 PROCEDURE — 99284 EMERGENCY DEPT VISIT MOD MDM: CPT | Mod: 25

## 2024-06-26 RX ORDER — FAMOTIDINE 20 MG/1
20 TABLET, FILM COATED ORAL ONCE
Status: COMPLETED | OUTPATIENT
Start: 2024-06-26 | End: 2024-06-26

## 2024-06-26 RX ORDER — LIDOCAINE HYDROCHLORIDE 20 MG/ML
10 SOLUTION OROPHARYNGEAL ONCE
Status: COMPLETED | OUTPATIENT
Start: 2024-06-26 | End: 2024-06-26

## 2024-06-26 RX ORDER — MAGNESIUM HYDROXIDE/ALUMINUM HYDROXICE/SIMETHICONE 120; 1200; 1200 MG/30ML; MG/30ML; MG/30ML
15 SUSPENSION ORAL ONCE
Status: COMPLETED | OUTPATIENT
Start: 2024-06-26 | End: 2024-06-26

## 2024-06-26 RX ORDER — ONDANSETRON 4 MG/1
4 TABLET, ORALLY DISINTEGRATING ORAL EVERY 6 HOURS PRN
Qty: 10 TABLET | Refills: 0 | Status: SHIPPED | OUTPATIENT
Start: 2024-06-26 | End: 2024-08-29

## 2024-06-26 RX ADMIN — ALUMINUM HYDROXIDE, MAGNESIUM HYDROXIDE, AND SIMETHICONE 15 ML: 200; 200; 20 SUSPENSION ORAL at 09:27

## 2024-06-26 RX ADMIN — LIDOCAINE HYDROCHLORIDE 10 ML: 20 SOLUTION ORAL at 09:28

## 2024-06-26 RX ADMIN — SODIUM CHLORIDE 1000 ML: 9 INJECTION, SOLUTION INTRAVENOUS at 09:11

## 2024-06-26 RX ADMIN — FAMOTIDINE 20 MG: 20 TABLET, FILM COATED ORAL at 10:33

## 2024-06-26 ASSESSMENT — LIFESTYLE VARIABLES
VISUAL DISTURBANCES: NOT PRESENT
HEADACHE, FULLNESS IN HEAD: VERY MILD
PAROXYSMAL SWEATS: NO SWEAT VISIBLE
AGITATION: NORMAL ACTIVITY
TOTAL SCORE: 1
ORIENTATION AND CLOUDING OF SENSORIUM: ORIENTED AND CAN DO SERIAL ADDITIONS
NAUSEA AND VOMITING: NO NAUSEA AND NO VOMITING
ANXIETY: NO ANXIETY, AT EASE
AUDITORY DISTURBANCES: NOT PRESENT
TREMOR: NO TREMOR

## 2024-06-26 ASSESSMENT — ACTIVITIES OF DAILY LIVING (ADL)
ADLS_ACUITY_SCORE: 35
ADLS_ACUITY_SCORE: 35
ADLS_ACUITY_SCORE: 33
ADLS_ACUITY_SCORE: 35
ADLS_ACUITY_SCORE: 35

## 2024-06-26 ASSESSMENT — COLUMBIA-SUICIDE SEVERITY RATING SCALE - C-SSRS
6. HAVE YOU EVER DONE ANYTHING, STARTED TO DO ANYTHING, OR PREPARED TO DO ANYTHING TO END YOUR LIFE?: NO
1. IN THE PAST MONTH, HAVE YOU WISHED YOU WERE DEAD OR WISHED YOU COULD GO TO SLEEP AND NOT WAKE UP?: NO
2. HAVE YOU ACTUALLY HAD ANY THOUGHTS OF KILLING YOURSELF IN THE PAST MONTH?: NO

## 2024-06-26 NOTE — ED PROVIDER NOTES
"  Emergency Department Note      History of Present Illness     Chief Complaint   Abdominal Pain    HPI   Jenniffer Young is a 29 year old female with a history of substance abuse, anxiety, and depression who presents to the emergency department for evaluation of abdominal pain. Over the weekend, she reports snorting cocaine, and drinks on a daily basis. She has had dull abdominal pain that radiates over her entire abdomen and into her back since doing cocaine. Eating and drinking immediately cause this pain to localize over the center of her abdomen and become sharp in nature. At rest her pain is a 6-7/10, and when she drinks it can spike up to a 9-10/10 in severity. She does endorse drinking frequently, and has 2-3 beers or margaritas each night. She doesn't do cocaine as often, but when she does she snorts it. Denies any IV drug use. Her last drink of alcohol was last night. She has not looked into treatment for substance abuse yet at this time. She is willing to consider possible outpatient follow up for this issue.     Independent Historian   None    Review of External Notes   None  Past Medical History   Medical History and Problem List   Anxiety disorder  Depression  Marijuana dependence  Pelvic pain   Psychosis  Sepsis  Bipolar  Drug use  Chicken pox  Lyme disease    Medications   albuterol  fluticasone  haloperidol  hydroxyzine  lamotrigine  metformin  ondansetron  oxycodone-acetaminophen  prednisone  risperidone  Sumatriptan  Haloperidol  Lamotrigine  Ketorolac  Hydroxyzine pamoate  Lamotrigine  vyvanse    Surgical History   EGD  Tonsillectomy  Tubal ligation  Physical Exam   Patient Vitals for the past 24 hrs:   BP Temp Temp src Pulse Resp SpO2 Height Weight   06/26/24 1033 105/65 -- -- 59 -- -- -- --   06/26/24 0931 121/71 -- -- 68 -- -- -- --   06/26/24 0808 116/73 98.3  F (36.8  C) Oral 56 18 100 % -- --   06/26/24 0553 116/43 97.7  F (36.5  C) Temporal 71 16 100 % 1.626 m (5' 4\") 59 kg (130 lb) "     Physical Exam  General: Alert and cooperative with exam. Patient in mild distress. Normal mentation.  Head:  Scalp is NC/AT  Eyes:  No scleral icterus, PERRL  ENT:  The external nose and ears are normal. The oropharynx is normal and without erythema; mucus membranes are moist. Uvula midline, no evidence of deep space infection.  Neck:  Normal range of motion without rigidity.  CV:  Regular rate and rhythm    No pathologic murmur   Resp:  Breath sounds are clear bilaterally    Non-labored, no retractions or accessory muscle use  GI:  Abdomen is soft, no distension. No peritoneal signs. Mild epigastric and RUQ tenderness.  MS:  No lower extremity edema   Skin:  Warm and dry, No rash or lesions noted.  Neuro: Oriented x 3. No gross motor deficits.     Diagnostics   Lab Results   Labs Ordered and Resulted from Time of ED Arrival to Time of ED Departure   COMPREHENSIVE METABOLIC PANEL - Abnormal       Result Value    Sodium 141      Potassium 4.0      Carbon Dioxide (CO2) 28      Anion Gap 7      Urea Nitrogen 9.3      Creatinine 0.79      GFR Estimate >90      Calcium 9.1      Chloride 106      Glucose 106 (*)     Alkaline Phosphatase 56      AST 11      ALT 10      Protein Total 6.1 (*)     Albumin 4.0      Bilirubin Total 0.2     CBC WITH PLATELETS AND DIFFERENTIAL - Abnormal    WBC Count 6.2      RBC Count 3.59 (*)     Hemoglobin 11.0 (*)     Hematocrit 33.1 (*)     MCV 92      MCH 30.6      MCHC 33.2      RDW 12.3      Platelet Count 228      % Neutrophils 38      % Lymphocytes 49      % Monocytes 8      % Eosinophils 4      % Basophils 0      % Immature Granulocytes 0      NRBCs per 100 WBC 0      Absolute Neutrophils 2.4      Absolute Lymphocytes 3.0      Absolute Monocytes 0.5      Absolute Eosinophils 0.2      Absolute Basophils 0.0      Absolute Immature Granulocytes 0.0      Absolute NRBCs 0.0     LIPASE - Normal    Lipase 18     HCG QUALITATIVE PREGNANCY - Normal    hCG Serum Qualitative Negative          Imaging   US Abdomen Limited   Final Result   IMPRESSION:   1.  There is diffuse gallbladder wall thickening and a trace amount of   pericholecystic fluid.   2.  No gallstones are identified. No biliary dilatation.      STEPHANIE CLAUDIO MD            SYSTEM ID:  [id number]          EKG   None    Independent Interpretation   None  ED Course    Medications Administered   Medications   sodium chloride 0.9% BOLUS 1,000 mL (0 mLs Intravenous Stopped 6/26/24 1050)   alum & mag hydroxide-simethicone (MAALOX) suspension 15 mL (15 mLs Oral $Given 6/26/24 0927)   lidocaine (viscous) (XYLOCAINE) 2 % solution 10 mL (10 mLs Mouth/Throat $Given 6/26/24 0928)   famotidine (PEPCID) tablet 20 mg (20 mg Oral $Given 6/26/24 1033)       Procedures   Procedures     Discussion of Management   None    Social Determinants of Health adding to complexity of care   None    ED Course   ED Course as of 06/26/24 2209 Wed Jun 26, 2024   0644 I obtained history and examined the patient as noted above.     0943 I rechecked the patient and explained findings.  I discussed plan for discharge home.       Medical Decision Making / Diagnosis   CMS Diagnoses: None    MIPS       None    [facility]   Jenniffer Young is a 29 year old female with history of cocaine and alcohol abuse presents with epigastric pain.  Patient's medical history and records reviewed.  On evaluation she is tolerating oral intake.  Provided GI cocktail with some improvement in symptoms as well as IV fluids and Pepcid.  Labs and imaging were obtained.  Labs without significant acute findings as above.  Right upper quadrant ultrasound demonstrates gallbladder wall thickening and trace amount of pericholecystic fluid though no gallstones are identified and there is low clinical suspicion for cholecystitis at this time.  Presentation most consistent with gastritis.  After discussion with patient, outpatient referral for chemical dependency assessment was placed.  Recommended continued  supportive care including Pepcid and Tylenol as needed for symptom relief.  Close follow-up with PCP recommended.  Return precautions discussed.  Patient discharged home.  Clinically sober at time of discharge.  No evidence of significant withdrawal.    Disposition   The patient was discharged.     ICD-10 Codes:     ICD-10-CM    1. Abdominal pain, epigastric  R10.13       2. Substance abuse (H)  F19.10 Adult Mental Health [facility] Referral           Discharge Medications   Discharge Medication List as of 6/26/2024 10:51 AM        START taking these medications    Details   !! ondansetron (ZOFRAN ODT) 4 MG ODT tab Take 1 tablet (4 mg) by mouth every 6 hours as needed, Disp-10 tablet, R-0, Local Print       !! - Potential duplicate medications found. Please discuss with provider.            Scribe Disclosure:  I, Elizabeth Azevdeo, am serving as a scribe at 7:45 AM on 6/26/2024 to document services personally performed by Pepito Adams DO based on my observations and the provider's statements to me.        Pepito Adams DO  06/26/24 0121

## 2024-06-26 NOTE — TELEPHONE ENCOUNTER
Patient did cocaine two/three days ago and since then is having stabbing pain whenever she drinks or eats anything. The pain is in the center of the upper abdomin. The pain will last for 30-45 seconds. Patient continues to eat and drink through the pain. Pain rating 8/10 with eating and right now is a 6-7/10. The pain doesn't completely go away but it is much better after the stabbing pain goes away. Pain goes into her chest and into her upper back.  Denies difficulty breathing.    Protocol recommends ED now  Patient care advice given. Patient will have another adult drive to [facility] ED now  Marlyn Swartz RN   06/26/24 5:29 AM  [facility] Nurse Advisor    Reason for Disposition   Chest pain   Cocaine use within last 3 days    Additional Information   Negative: SEVERE difficulty breathing (e.g., struggling for each breath, speaks in single words)   Negative: Shock suspected (e.g., cold/pale/clammy skin, too weak to stand, low BP, rapid pulse)   Negative: Difficult to awaken or acting confused (e.g., disoriented, slurred speech)   Negative: Passed out (i.e., lost consciousness, collapsed and was not responding)   Negative: Visible sweat on face or sweat dripping down face   Negative: Sounds like a life-threatening emergency to the triager   Negative: Followed an abdomen (stomach) injury   Negative: SEVERE difficulty breathing (e.g., struggling for each breath, speaks in single words)   Negative: Shock suspected (e.g., cold/pale/clammy skin, too weak to stand, low BP, rapid pulse)   Negative: Difficult to awaken or acting confused (e.g., disoriented, slurred speech)   Negative: Passed out (i.e., lost consciousness, collapsed and was not responding)   Negative: Chest pain lasting longer than 5 minutes and ANY of the following:    history of heart disease  (i.e., heart attack, bypass surgery, angina, angioplasty, CHF; not just a heart murmur)    described as crushing, pressure-like, or heavy    age > 50    age >  "30 AND at least one cardiac risk factor (i.e., hypertension, diabetes, obesity, smoker or strong family history of heart disease)    not relieved with nitroglycerin   Negative: Heart beating < 50 beats per minute OR > 140 beats per minute   Negative: Visible sweat on face or sweat dripping down face   Negative: Sounds like a life-threatening emergency to the triager   Negative: Followed a chest injury   Negative: SEVERE chest pain   Negative: [1] Chest pain (or \"angina\") comes and goes AND [2] is happening more often (increasing in frequency) or getting worse (increasing in severity)  (Exception: Chest pains that last only a few seconds.)   Negative: Pain also in shoulder(s) or arm(s) or jaw  (Exception: Pain is clearly made worse by movement.)   Negative: Difficulty breathing   Negative: Dizziness or lightheadedness   Negative: Coughing up blood    Protocols used: Abdominal Pain - Upper-A-AH, Chest Pain-A-AH    "

## 2024-06-26 NOTE — DISCHARGE INSTRUCTIONS
Continue 20 mg Pepcid twice daily as needed for stomach pain  Tylenol as needed for additional pain relief  Zofran as needed for nausea

## 2024-06-26 NOTE — ED TRIAGE NOTES
Abdominal pain x 3 days and gets much worse when eating or drinking.     Triage Assessment (Adult)       Row Name 06/26/24 0554          Triage Assessment    Airway WDL WDL        Respiratory WDL    Respiratory WDL WDL        Skin Circulation/Temperature WDL    Skin Circulation/Temperature WDL WDL        Cardiac WDL    Cardiac WDL WDL        Peripheral/Neurovascular WDL    Peripheral Neurovascular WDL WDL        Cognitive/Neuro/Behavioral WDL    Cognitive/Neuro/Behavioral WDL WDL

## 2024-06-30 ENCOUNTER — HOSPITAL ENCOUNTER (EMERGENCY)
Facility: CLINIC | Age: 29
Discharge: HOME | End: 2024-06-30
Attending: EMERGENCY MEDICINE | Admitting: EMERGENCY MEDICINE
Payer: COMMERCIAL

## 2024-06-30 VITALS
TEMPERATURE: 98 F | HEART RATE: 63 BPM | WEIGHT: 140.4 LBS | SYSTOLIC BLOOD PRESSURE: 119 MMHG | RESPIRATION RATE: 16 BRPM | BODY MASS INDEX: 23.97 KG/M2 | OXYGEN SATURATION: 100 % | HEIGHT: 64 IN | DIASTOLIC BLOOD PRESSURE: 77 MMHG

## 2024-06-30 DIAGNOSIS — F31.64 BIPOLAR DISORDER, CURRENT EPISODE MIXED, SEVERE, WITH PSYCHOTIC FEATURES (H): ICD-10-CM

## 2024-06-30 DIAGNOSIS — F19.11 HISTORY OF SUBSTANCE ABUSE (H): ICD-10-CM

## 2024-06-30 PROCEDURE — 99244 OFF/OP CNSLTJ NEW/EST MOD 40: CPT | Performed by: PSYCHIATRY & NEUROLOGY

## 2024-06-30 PROCEDURE — 99284 EMERGENCY DEPT VISIT MOD MDM: CPT

## 2024-06-30 PROCEDURE — 250N000013 HC RX MED GY IP 250 OP 250 PS 637: Performed by: NURSE PRACTITIONER

## 2024-06-30 RX ORDER — HYDROXYZINE HYDROCHLORIDE 25 MG/1
25-75 TABLET, FILM COATED ORAL 3 TIMES DAILY PRN
COMMUNITY
End: 2024-06-30

## 2024-06-30 RX ORDER — HYDROXYZINE HYDROCHLORIDE 50 MG/1
50 TABLET, FILM COATED ORAL ONCE
Status: COMPLETED | OUTPATIENT
Start: 2024-06-30 | End: 2024-06-30

## 2024-06-30 RX ORDER — LAMOTRIGINE 200 MG/1
400 TABLET ORAL DAILY
COMMUNITY

## 2024-06-30 RX ORDER — HYDROXYZINE HYDROCHLORIDE 50 MG/1
25-50 TABLET, FILM COATED ORAL 3 TIMES DAILY PRN
Qty: 90 TABLET | Refills: 0 | Status: SHIPPED | OUTPATIENT
Start: 2024-06-30

## 2024-06-30 RX ADMIN — HYDROXYZINE HYDROCHLORIDE 50 MG: 50 TABLET, FILM COATED ORAL at 15:12

## 2024-06-30 ASSESSMENT — ACTIVITIES OF DAILY LIVING (ADL)
ADLS_ACUITY_SCORE: 35

## 2024-06-30 NOTE — ED NOTES
Patient brought over from ED triage.  Patient is here because for the last 2 months she has become more paranoid. She realizes the things she thinks are not real.  She has thoughts that her  wants to sleep with other people. She has thoughts that her kids grandpa wants to molest them. She has thoughts that other want to molest her daughter. She has intrusive thoughts that she understands are not real. This leads to more depression with suicidal thoughts but no intent to act on them as she would not want to hurt her children. She has a history of Bipolar and is on lamictal and haldol for this and she takes hydroxyzine but has run out. She cannot see her provider until the 9th so she came here for help.  She was using at least 3 beers per day up until 2 days ago and she smokes marijuana daily. 2 weeks ago she used cocaine after she got it as a gift. Nursing and risk assessments completed.  Assessments reviewed with LMHP and physician. Video monitoring in progress, patient informed.  Admission information reviewed with patient. Patient given a tour of [facility] and instructions on using the facility. Questions regarding [facility] addressed.

## 2024-06-30 NOTE — PROGRESS NOTES
Glasses Prescription given to patient. Triage & Transition Services, Extended [facility]     Client Name: Jenniffer Young    Date: June 30, 2024    Patient was seen    Mode of Assessment:      Service Type: (P) excused from group session  Session Start Time:  (P) 1100    Session End Time: (P) 1115  Session Length: (P) 15  Site Location: [facility] EMERGENCY DEPT                             EMP10  Total Number ofAttendees: (P) 4  Topic:   (P) other (see comments) (Goal setting and practicing mindfulness through gratitude.)   Response:       Keyana Crenshaw, [facility]   Licensed Mental Health Professional (LMHP), Extended Care  [phone]

## 2024-06-30 NOTE — ED PROVIDER NOTES
"  Emergency Department Note      History of Present Illness     Chief Complaint   Mental Health Problem and Suicidal      HPI   Jenniffer Young is a 29 year old female with history of anxiety, depression, bipolar disorder, and substance use disorder who presents for evaluation of a mental health problem. Jenniffer reports her bipolar disorder symptoms, including intrusive thoughts and passive suicidal ideations, have worsened over the last 2 months. She last saw her psychiatrist 2 months ago because she missed her last appointment, and has one scheduled for 7/9 but feels she needs support sooner. She has been consuming about 1 alcoholic drink and marijuana daily. She denies recent amphetamine or cocaine use. Denies fever or vomiting. Of note she was seen in the [facility] ED 4 days ago for epigastric abdominal pain after reportedly snorting cocaine, but today notes this pain is minimal.    Independent Historian   None    Review of External Notes   None    Past Medical History     Medical History and Problem List   Anxiety disorder  Depression  Marijuana dependence  Pelvic pain  Psychosis  Sepsis  Bipolar disorder  Substance use disorder  Chicken pox  Lyme disease    Medications   Haloperidol  Lamotrigine    Surgical History   EGD  Tonsillectomy  Tubal ligation    Physical Exam     Patient Vitals for the past 24 hrs:   BP Temp Temp src Pulse Resp SpO2 Height Weight   06/30/24 1015 107/58 97  F (36.1  C) Oral 88 16 99 % 1.626 m (5' 4\") 59 kg (130 lb)     Physical Exam  Eye:  Pupils are equal, round, and reactive.  Extraocular movements intact.    ENT:  No rhinorrhea.  Moist mucus membranes.  Normal tongue and tonsil.    Cardiac:  Regular rate and rhythm.  No murmurs, gallops, or rubs.    Pulmonary:  Clear to auscultation bilaterally.  No wheezes, rales, or rhonchi.    Abdomen:  Positive bowel sounds.  Abdomen is soft and non-distended, without focal tenderness.    Musculoskeletal:  Normal movement of all extremities " without evidence for deficit.    Skin:  Warm and dry without rashes.    Neurologic:  Non-focal exam without asymmetric weakness or numbness.     Psychiatric:  Normal affect with appropriate interaction with examiner.     Diagnostics     Lab Results   Labs Ordered and Resulted from Time of ED Arrival to Time of ED Departure - No data to display    Imaging   None performed    Independent Interpretation   None    ED Course      Medications Administered   Medications - No data to display    Procedures   Procedures     Discussion of Management   None    Social Determinants of Health adding to complexity of care   Stress/Adjustment Disorders    ED Course   ED Course as of 06/30/24 1051   Sun Jun 30, 2024   1039 I obtained history and examined the patient as noted above. We discussed plans to transfer to [facility] and the patient is comfortable with this plan.       Medical Decision Making / Diagnosis     CMS Diagnoses: None    MIPS       None    MDM   Jenniffer Young is a 29 year old female presenting to us with increasing depression and thoughts of self-harm.  She admits to abusing marijuana and using some cocaine recently.  She otherwise has no other physical concerns for me and is hoping to get help in the EMPATH unit to get stabilized on psychiatric medications and and meet with a counselor.    On my exam, she appears clinically well.  She has no physical concerns and I do not believe she would require blood work or advanced imaging.  She contracts for safety and will be transported to the EMPATH unit for further assessment.    Disposition   The patient was transferred to [facility].     Diagnosis     ICD-10-CM    1. Bipolar disorder, current episode mixed, severe, with psychotic features (H)  F31.64       2. History of substance abuse (H)  F19.11            Discharge Medications   New Prescriptions    No medications on file         Scribe Disclosure:  IJoy, am serving as a scribe at 10:50 AM on 6/30/2024 to  document services personally performed by Trierweiler, Chad A, MD based on my observations and the provider's statements to me.        Trierweiler, Chad A, MD  07/22/24 0882

## 2024-06-30 NOTE — ED NOTES
[facility]  ED to EMPATH Checklist:      Goal for EMPATH: Suicidality,  Referral and resources, and Time to stabilize    Current Behavior: Sad    Safety Concerns: Suicidal, no plan    Legal Hold Status: Voluntary    Medically Cleared by ED provider: Yes    Patient Therapeutically Searched: Therapeutic search by ED staff (strings, belts, shoes, pockets, electronics, etc.)    Belongings: Remain with patient    Independent Ambulation at Baseline: Yes/No: Yes    Participates in Care/Conversation: Yes/No: Yes    Patient Informed about EMPATH: Yes/No: Yes    DEC: Ordered and pending    Patient Ready to be Transferred to EMPATH? Yes/No: Yes

## 2024-06-30 NOTE — CONSULTS
"Diagnostic Evaluation Consultation  Crisis Assessment    Patient Name: Jenniffer Young  Age:  29 year old  Legal Sex: female  Gender Identity: female  Race: White  Ethnicity: Not  or   Language: English      Patient was assessed: In person   Crisis Assessment Start Date: 06/30/24  Crisis Assessment Start Time: 1330  Crisis Assessment Stop Time: 1400  Patient location: [facility] EMERGENCY DEPT                             [id number]    Referral Data and Chief Complaint  Jenniffer Young presents to the ED with family/friends (with ). Patient is presenting to the ED for the following concerns: Paranoia, Depression, Suicidal ideation, Other (see comment), Substance use (mood episode with mixed features of depression and niko).   Factors that make the mental health crisis life threatening or complex are:  Pt carries a diagnosis of bipolar I and presents to the ED with her  today for a worsening mood episode with mixed features of depression and niko accompanied by paranoia. Upon assessment, pt presents as tearful with flat affect. She tells this writer that she has been falling asleep between 11 pm-12 am and then waking up again between 2-4 am and again between 5-6 am. In addition to worsening sleep, she has been increasingly gambling (spending a couple hundred dollars per week) and using substances. She has been drinking alcohol daily (1-2 drinks/day for the past year), smoking THC daily, and used cocaine a couple of weeks ago. She has also noted an increase in depressive symptoms, feeling that she is \"not good enough\", only eating 1-2 meals/day, and not remembering to brush her teeth. With the increase in depression, she has had passive suicidal thoughts without plan or intent every other day for about an hour. She has been increasingly paranoid. She believes that her  wants to have sex with other women, even women that the pt knows but that her  does not. For " "example, pt believes that her  may want to have a sex with a woman in one of the podcasts she listens to even though her  has no familiarity with this woman. She also thinks that her  may be fantasizing about her daughter, even though pt knows this to not be true. She speaks at length about her  is not a \"pedophile\" and is faithful to her but that her brain is convincing her otherwise. This morning, pt was again having these paranoid thoughts and \"ran away\" to a park where she sat for awhile before her  brought her to the hospital. She endorses disassociating \"50 percent of the time\". She denies AH/VH or HI..      Informed Consent and Assessment Methods  Explained the crisis assessment process, including applicable information disclosures and limits to confidentiality, assessed understanding of the process, and obtained consent to proceed with the assessment.  Assessment methods included conducting a formal interview with patient, review of medical records, collaboration with medical staff, and obtaining relevant collateral information from family and community providers when available.  : done     Patient response to interventions: acceptance expressed, verbalizes understanding  Coping skills were attempted to reduce the crisis:  Pt has been reaching out to her  for support.     History of the Crisis   Pt carries a diagnosis of bipolar I and was hospitalized at [facility] in December 2015 for an episode of niko with psychotic features. She has no known history of suicide attempts. Pt tells this writer that she is taking her medication consistently but has not had a refill of hydroxyzine for a year. She currently has a psychiatrist. She does not have a therapist at present and notes that it is often difficult for her to follow-through with participating in therapy. She reports that she has difficulty communicating her emotions to others and tends to \"bottle them up\".    Brief " Psychosocial History  Family:  , Children yes (Pt has 2 children ages 4 and 2.)  Support System:  , Parent(s)  Employment Status:  employed full-time (Pt is a massage therapist.)  Source of Income:  salary/wages  Financial Environmental Concerns:  none  Current Hobbies:  family functions  Barriers in Personal Life:  mental health concerns, emotional concerns    Significant Clinical History  Current Anxiety Symptoms:  racing thoughts, excessive worry, shortness of breath or racing heart, anxious  Current Depression/Trauma:  sense of doom, difficulty concentrating, crying or feels like crying, negativistic, low self esteem, sadness, thoughts of death/suicide, excessive guilt (passive SI only)  Current Somatic Symptoms:  racing thoughts, excessive worry, shortness of breath or racing heart, anxious  Current Psychosis/Thought Disturbance:   (paranoia)  Current Eating Symptoms:  loss of appetite  Chemical Use History:  Alcohol: Daily  Last Use:: 06/28/24  Benzodiazepines: None  Opiates: None  Last Use:: 06/16/24 (pt was given cocaine as a gift)  Marijuana: Daily  Last Use:: 06/30/24  Other Use: None   Past diagnosis:  Bipolar Disorder  Family history:  No known history of mental health or chemical health concerns  Past treatment:  Individual therapy, Psychiatric Medication Management, Inpatient Hospitalization  Details of most recent treatment:  Pt has an established psychiatrist.  Other relevant history:          Collateral Information  Is there collateral information: No (This writer left a message for pt's  at [phone]. No answer.)       Risk Assessment  [place] Suicide Severity Rating Scale Full Clinical Version: 6/30/24  Suicidal Ideation  Q1 Wish to be Dead (Lifetime): Yes  Q2 Non-Specific Active Suicidal Thoughts (Lifetime): Yes  3. Active Suicidal Ideation with any Methods (Not Plan) Without Intent to Act (Lifetime): No  Q4 Active Suicidal Ideation with Some Intent to Act, Without  Specific Plan (Lifetime): No  Q5 Active Suicidal Ideation with Specific Plan and Intent (Lifetime): No  Q6 Suicide Behavior (Lifetime): no     Suicidal Behavior (Lifetime)  Actual Attempt (Lifetime): No  Has subject engaged in non-suicidal self-injurious behavior? (Lifetime): No  Interrupted Attempts (Lifetime): No  Aborted or Self-Interrupted Attempt (Lifetime): No  Preparatory Acts or Behavior (Lifetime): No    [place] Suicide Severity Rating Scale Recent: 6/30/24  Suicidal Ideation (Recent)  Q1 Wished to be Dead (Past Month): yes  Q2 Suicidal Thoughts (Past Month): yes  Q3 Suicidal Thought Method: no  Q4 Suicidal Intent without Specific Plan: no  Q5 Suicide Intent with Specific Plan: no  Level of Risk per Screen: low risk  Intensity of Ideation (Recent)  Most Severe Ideation Rating (Past 1 Month): 2  Frequency (Past 1 Month): 2-5 times in week  Duration (Past 1 Month): 1-4 hours/a lot of time  Controllability (Past 1 Month): Can control thoughts with little difficulty  Deterrents (Past 1 Month): Deterrents definitely stopped you from attempting suicide  Reasons for Ideation (Past 1 Month): Completely to end or stop the pain (You couldn't go on living with the pain or how you were feeling)  Suicidal Behavior (Recent)  Actual Attempt (Past 3 Months): No  Total Number of Actual Attempts (Past 3 Months): 0  Has subject engaged in non-suicidal self-injurious behavior? (Past 3 Months): No  Interrupted Attempts (Past 3 Months): No  Total Number of Interrupted Attempts (Past 3 Months): 0  Aborted or Self-Interrupted Attempt (Past 3 Months): No  Total Number of Aborted or Self-Interrupted Attempts (Past 3 Months): 0  Preparatory Acts or Behavior (Past 3 Months): No  Total Number of Preparatory Acts (Past 3 Months): 0    Environmental or Psychosocial Events: challenging interpersonal relationships, ongoing abuse of substances  Protective Factors: Protective Factors: strong bond to family unit, community support, or  employment, intact marriage or domestic partnership, responsibilities and duties to others, including pets and children, sense of importance of health and wellness, supportive ongoing medical and mental health care relationships, help seeking, able to access care without barriers    Does the patient have thoughts of harming others? Feels Like Hurting Others: no  Previous Attempt to Hurt Others: no  Is the patient engaging in sexually inappropriate behavior?: no    Is the patient engaging in sexually inappropriate behavior?  no        Mental Status Exam   Affect: Flat  Appearance: Appropriate  Attention Span/Concentration: Attentive  Eye Contact: Engaged    Fund of Knowledge: Appropriate   Language /Speech Content: Fluent  Language /Speech Volume: Normal  Language /Speech Rate/Productions: Normal  Recent Memory: Intact  Remote Memory: Intact  Mood: Anxious, Sad, Depressed  Orientation to Person: Yes   Orientation to Place: Yes  Orientation to Time of Day: Yes  Orientation to Date: Yes     Situation (Do they understand why they are here?): Yes  Psychomotor Behavior: Normal  Thought Content: Paranoia, Suicidal (passive SI only)  Thought Form: Obsessive/Perseverative, Paranoia     Medication  Psychotropic medications:   Medication Orders - Psychiatric (From admission, onward)      None             Current Care Team  Patient Care Team:  No Ref-Primary, Physician as PCP - General  Dr. Christianne Lara as Psychiatrist    Diagnosis  Patient Active Problem List   Diagnosis Code    Depression F32.A    Marijuana dependence (H) F12.20    Pelvic pain in female R10.2    Psychosis (H) F29    Sepsis, unspecified organism (H) A41.9    Severe mixed bipolar I disorder with psychotic features (H) F31.64       Primary Problem This Admission  Active Hospital Problems    Severe mixed bipolar I disorder with psychotic features (H) F31.64    Clinical Summary and Substantiation of Recommendations   It is the recommendation of this writer that pt  "discharge home to follow-up with therapy and psychiatry. She has an established psychiatrist and was referred to therapy. Pt was recommended to remain on observation status overnight for continued treatment of symptoms but states that she would like to be at home with her  instead. Pt does not appear to meet criteria for a 72 HH. Although pt presents with paranoia, she has insight into the fact that her thoughts are paranoid in nature and that her  is not cheating on her or a \"pedophile\". Despite having passive suicidal thoughts, she denies a plan or intent for suicide. She identifies her family as a protective factor against suicide.    Patient coping skills attempted to reduce the crisis:  Pt has been reaching out to her  for support.    Disposition  Recommended disposition: Individual Therapy, Medication Management        Reviewed case and recommendations with attending provider. Attending Name: Dr. Nazario       Attending concurs with disposition: yes       Patient and/or validated legal guardian concurs with disposition:   yes       Final disposition:  discharge    Legal status on admission: Voluntary/Patient has signed consent for treatment    Assessment Details   Total duration spent with the patient: 30 min     CPT code(s) utilized: 35156 - Psychotherapy for Crisis - 60 (30-74*) min    ALTAGRACIA Freed, Psychotherapist  [facility] - Triage & Transition Services  Callback: [phone]          "

## 2024-06-30 NOTE — ED NOTES
Pt has been pleasant and cooperative.  Patient agrees to discharge plan. Discharge instructions reviewed with patient including follow-up care plan. Medications: Hydroxyzine sent to the pt's pharmacy. Reviewed safety plan and outpatient resources. Denies SI and HI. All belongings that were brought into the hospital have been returned to patient. Escorted off the unit at 1800 accompanied by [facility] staff. Discharged to home via ride from .

## 2024-06-30 NOTE — DISCHARGE INSTRUCTIONS
Aftercare Plan    Follow up with established providers and supports as scheduled. Continue taking medications as prescribed. Abstain from drugs and alcohol. Utilize your [facility] mental [facility] crisis team as needed. They are available 24/7. Contact information is listed below.     The following appointment(s) and/or referral(s) were made on your behalf. If you need to make changes or cancel please contact the clinic/provider directly.     Scheduled Appointment(s):    Type: [facility] for Programmatic Care/Diagnostic Assessment  Provider: Toño Mckeon LPCC, PHUC   Location: [address] [place] [id number]   Phone: [phone]  Date/Time: 7/2/2024 10:30 am  Instructions:     READ TO PATIENT: This appointment is in a hospital-based location. Before your visit, you may want to check with your insurance company for coverage and referral options, including cost differences between services provided at different clinic settings. NOTE: If patient has questions, provide the following link to the Hospital-Based FAQ on the [employer] website: [email or web address]  The Assessment Center is located at the [facility], [facility], on the [facility].    Please come to the [facility] entrance near the Emergency Department.  Follow the signs to the Emergency Room and park in the RED or YELLOW parking ramp.  Enter in the [facility].  The Assessment Center [facility].    -If getting a ride, please request drop off at the address above.  -If driving, discounted parking is available in the Red or Yellow ramp across from the [facility] entrance.    Our reception area is conveniently located as you come into the [facility] in [address], next door to [employer].  Please bring a current list of medication and contact information for your other providers.    IMPORTANT: If you need to change your appointment, please call Behavioral Access  [phone].    Please note, we do ask for a minimum of a 24-hour notice for canceled or rescheduled appointments.        Individual Therapy    Date: Tuesday, 7/9/2024  Time: 12:00 pm - 1:00 pm  Provider: Justin SANTIAGO  LICSW  Location: [facility], [address], [address], [place] [address]  Phone: [phone]  Type: Therapy - Initial (In-Person)    As discussed, please consider having your mother bring you to your therapy appointment to increase accountability.     Remember: give the referrals 3 sessions prior to calling it quits. Do you trust them? Do you feel understood? Do you think they can help? Check in with yourself after each session    If I am feeling unsafe or I am in a crisis, I will:   Contact my established care providers  Call [facility] COPE: [phone]  Call the National Suicide Prevention Lifeline: 988  Go to the nearest emergency room   Call 911     Warning signs that I or other people might notice when a crisis is developing for me: changes to sleep, appetite or mood, increased anger, agitation or irritability, feeling depressed or hopeless, spending more time alone or talking less, increased crying, decreased productivity, seeing or hearing things that aren't there, thoughts of not wanting to live anymore or of actually killing myself, thoughts of hurting others    Things I am able to do on my own to cope or help me feel better: watching a favorite tv show or movie, listening to music I enjoy, going outside and breathing fresh air, going for a walk or exercising, taking a shower or bath, a cold or hot beverage, a healthy snack, drawing/coloring/painting, journaling, singing or dancing, deep breathing     I can try practicing square breathing when I begin to feel anxious - inhale through the nose for the count of 4 and the first line on the square. Exhale through the mouth for the count of 4 for the second line of the square. Repeat  to complete the square. Repeat the square as many times as needed.    I can also use my five senses to practice mindfulness and grounding. What are five things I can see, four things I can hear, three things I can feel, two things I can smell, and one thing I can taste.     Things that I am able to do with others to cope or help me feel better: sometimes just talking or spending time with someone else, sharing a meal or having coffee, watching a movie or playing a game, going for a walk or exercising    I can also use community resources including mental health hotlines, [facility] crisis teams, or apps.     Things I can use or do for distraction: movies/tv, music, reading, games, drawing/coloring/painting or other art, essential oils, exercise, cleaning/organizing, puzzles, crossword puzzles, word search, Sudoku       I can also download a meditation or relaxation shante, like Calm, Headspace, or Insight Timer (all three offer a free version)    Changes I can make to support my mental health and wellness: Attend scheduled mental health therapy and psychiatric appointments. Take my medications as prescribed. Maintain a daily schedule/routine. Abstain from all mood altering substances, including drugs, alcohol, or medications not currently prescribed to me. Implement a self-care routine.      People in my life that I can ask for help: friends or family, trusted teachers/staff/colleagues, trusted members of my community or place of [place], mental health crisis lines, or 911    Your [facility] has a mental health crisis team you can call 24/7: [facility] Adult, [phone]    Other things that are important when I m in crisis: to remember that the feelings I am having right now are temporary, and it won't feel like this forever, and that it is okay and important to ask for help    Crisis Lines  Crisis Text Line  Text 710219  You will be connected with a trained live crisis counselor to provide support.    Braxton padilla,  "texto  JUNIE a 147681 o texto a 442-AYUDAME en WhatsApp    National Hope Line  1.800.SUICIDE [7226806]      Community Resources  Fast Tracker  Linking people to mental health and substance use disorder resources  Tealium.NextCloud     [place] Mental Health Warm Line  Peer to peer support  Monday thru Saturday, 12 pm to 10 pm  820.328.8740 or 3.962.983.1072  Text \"Support\" to 48421    National [place] on Mental Illness (CEE)  500.867.7361 or 1.888.CEE.HELPS      Mental Health Apps  My3  https://MiQ Corporationpp.org/    VirtualHopeBox  https://Digitalsmiths/apps/virtual-hope-box/      Additional Information  Today you were seen by a licensed mental health professional through Triage and Transition services, Behavioral Healthcare Providers ([facility])  for a crisis assessment in the Emergency Department at [facility].  It is recommended that you follow up with your established providers (psychiatrist, mental health therapist, and/or primary care doctor - as relevant) as soon as possible. Coordinators from [facility] will be calling you in the next 24-48 hours to ensure that you have the resources you need.  You can also contact [facility] coordinators directly at [phone]. You may have been scheduled for or offered an appointment with a mental health provider. [facility] maintains an extensive network of licensed behavioral health providers to connect patients with the services they need.  We do not charge providers a fee to participate in our referral network.  We match patients with providers based on a patient's specific needs, insurance coverage, and location.  Our first effort will be to refer you to a provider within your care system, and will utilize providers outside your care system as needed.        "

## 2024-06-30 NOTE — ED TRIAGE NOTES
Pt has intermittent suicidal thoughts without a plan  Pt did cocaine a week ago and has had issues controlling thoughts since   Pt is Bipolar   Pt contracts for safety

## 2024-06-30 NOTE — ED PROVIDER NOTES
[facility] Unit - Psychiatric Consultation  [facility] Emergency Department    Jenniffer Young MRN: [id number]   Age: 29 year old YOB: 1995     History     Chief Complaint   Patient presents with    Mental Health Problem    Suicidal     HPI  Jenniffer Young is a 29 year old female with history notable for bipolar disorder, substance abuse who presents with worsening insomnia, depression, paranoia in the context of recent substance abuse.Patient is approaching 8 hours in the emergency room.  Patient with long history of substance use concerns and mental health struggles.  Most recently taking lamotrigine 400 mg daily and haloperidol 4 mg daily.  Patient with recent cannabis, alcohol, and cocaine use.  History of snorting cocaine socially not daily.  Daily cannabis use and also daily alcohol use.  Depression has been worsening.  Worsening self cares.  Not sleeping well.  Decreased appetite.  Passive suicidal thoughts.  Her children are very protective and reports she has no plan or intent to harm herself at this time.  During time of interview with this provider, patient denied any suicidal thoughts.  Patient has felt her time on the unit has been very helpful and has reduced the intensity of her mental health symptoms.  Has had some paranoia involving her  recently.  Patient is able to reality check and understands that what she is experiencing is likely not true.  Fairly recent dissociation.  Patient has appointment with outpatient psychiatric prescriber on 7/9.  Patient has been working with her psychiatric prescriber since about 21 years old.  Patient trusts her outpatient psychiatric prescriber.  Patient is employed.  History of tubal ligation.  Patient would like to continue her current medications but get a refill of hydroxyzine since this has been helpful in the past but she has run out at home.  See note below from [facility] for additional details regarding current circumstances and also mental  "health history:    Per [facility] 6/30/2024:  Referral Data and Chief Complaint  Jenniffer Young presents to the ED with family/friends (with ). Patient is presenting to the ED for the following concerns: Paranoia, Depression, Suicidal ideation, Other (see comment), Substance use (mood episode with mixed features of depression and niko).   Factors that make the mental health crisis life threatening or complex are:  Pt carries a diagnosis of bipolar I and presents to the ED with her  today for a worsening mood episode with mixed features of depression and niko accompanied by paranoia. Upon assessment, pt presents as tearful with flat affect. She tells this writer that she has been falling asleep between 11 pm-12 am and then waking up again between 2-4 am and again between 5-6 am. In addition to worsening sleep, she has been increasingly gambling (spending a couple hundred dollars per week) and using substances. She has been drinking alcohol daily (1-2 drinks/day for the past year), smoking THC daily, and used cocaine a couple of weeks ago. She has also noted an increase in depressive symptoms, feeling that she is \"not good enough\", only eating 1-2 meals/day, and not remembering to brush her teeth. With the increase in depression, she has had passive suicidal thoughts without plan or intent every other day for about an hour. She has been increasingly paranoid. She believes that her  wants to have sex with other women, even women that the pt knows but that her  does not. For example, pt believes that her  may want to have a sex with a woman in one of the podcasts she listens to even though her  has no familiarity with this woman. She also thinks that her  may be fantasizing about her daughter, even though pt knows this to not be true. She speaks at length about her  is not a \"pedophile\" and is faithful to her but that her brain is convincing her otherwise. This " "morning, pt was again having these paranoid thoughts and \"ran away\" to a park where she sat for awhile before her  brought her to the hospital. She endorses disassociating \"50 percent of the time\". She denies AH/VH or HI..    History of the Crisis   Pt carries a diagnosis of bipolar I and was hospitalized at [facility] in December 2015 for an episode of niko with psychotic features. She has no known history of suicide attempts. Pt tells this writer that she is taking her medication consistently but has not had a refill of hydroxyzine for a year. She currently has a psychiatrist. She does not have a therapist at present and notes that it is often difficult for her to follow-through with participating in therapy. She reports that she has difficulty communicating her emotions to others and tends to \"bottle them up\".     Brief Psychosocial History  Family:  , Children yes (Pt has 2 children ages 4 and 2.)  Support System:  , Parent(s)  Employment Status:  employed full-time (Pt is a massage therapist.)  Source of Income:  salary/wages  Financial Environmental Concerns:  none  Current Hobbies:  family functions  Barriers in Personal Life:  mental health concerns, emotional concerns     Significant Clinical History  Current Anxiety Symptoms:  racing thoughts, excessive worry, shortness of breath or racing heart, anxious  Current Depression/Trauma:  sense of doom, difficulty concentrating, crying or feels like crying, negativistic, low self esteem, sadness, thoughts of death/suicide, excessive guilt (passive SI only)  Current Somatic Symptoms:  racing thoughts, excessive worry, shortness of breath or racing heart, anxious  Current Psychosis/Thought Disturbance:   (paranoia)  Current Eating Symptoms:  loss of appetite  Chemical Use History:  Alcohol: Daily  Last Use:: 06/28/24  Benzodiazepines: None  Opiates: None  Last Use:: 06/16/24 (pt was given cocaine as a gift)  Marijuana: Daily  Last Use:: " "06/30/24  Other Use: None   Past diagnosis:  Bipolar Disorder  Family history:  No known history of mental health or chemical health concerns  Past treatment:  Individual therapy, Psychiatric Medication Management, Inpatient Hospitalization  Details of most recent treatment:  Pt has an established psychiatrist.  Other relevant history:       Past Medical History  Past Medical History:   Diagnosis Date    Anxiety disorder     No Comments Provided     Past Surgical History:   Procedure Laterality Date    ESOPHAGOSCOPY, GASTROSCOPY, DUODENOSCOPY (EGD), COMBINED      2010 - found \"bile\" in stomach.    OTHER SURGICAL HISTORY      OTY344,NO PREVIOUS SURGERY    OTHER SURGICAL HISTORY      12/14/2015,27650.0,MD LAP DIAGNOSTIC ABDOMEN    TONSILLECTOMY      No Comments Provided     albuterol (PROAIR HFA/PROVENTIL HFA/VENTOLIN HFA) 108 (90 BASE) MCG/ACT Inhaler  albuterol (PROVENTIL HFA) 108 (90 Base) MCG/ACT inhaler  fluticasone (FLOVENT HFA) 220 MCG/ACT inhaler  haloperidol (HALDOL) 2 MG tablet  hydrOXYzine HCl (ATARAX) 50 MG tablet  lamoTRIgine (LAMICTAL) 200 MG tablet  ondansetron (ZOFRAN ODT) 4 MG ODT tab  ondansetron (ZOFRAN ODT) 4 MG ODT tab  acetaminophen (TYLENOL) 500 MG tablet  predniSONE (DELTASONE) 20 MG tablet      Allergies   Allergen Reactions    Oxycodone-Acetaminophen Nausea and Vomiting    Animal Dander Cough     Other reaction(s): Runny Nose  Nasal itching and congestion    Feathers Cough     Other reaction(s): Runny Nose  Itchy nose and nasal congestion.  Family history of feather allergy     Family History  No family history on file.  Social History   Social History     Tobacco Use    Smoking status: Some Days     Types: Vaping Device    Smokeless tobacco: Never   Substance Use Topics    Alcohol use: No     Alcohol/week: 0.0 standard drinks of alcohol     Comment: Alcoholic Drinks/day: occ    Drug use: Yes     Types: Marijuana     Comment: Drug use: Yes          Review of Systems  A medically appropriate " "review of systems was performed with pertinent positives and negatives noted in the HPI, and all other systems negative.    Physical Examination   BP: 107/58  Pulse: 88  Temp: 97  F (36.1  C)  Resp: 16  Height: 162.6 cm (5' 4\")  Weight: 59 kg (130 lb)  SpO2: 99 %    Physical Exam  General: Appears stated age.   Neuro: Alert and fully oriented. Extremities appear to demonstrate normal strength on visual inspection.   Integumentary/Skin: no rash visualized, normal color    Psychiatric Examination   Appearance: adequately groomed, appeared as age stated, and tired/sleepy appearing, no acute distress  Attitude:  cooperative  Eye Contact:  good  Mood:  depressed  Affect:  appropriate and in normal range and mood congruent  Speech:  clear, coherent  Psychomotor Behavior:  no evidence of tardive dyskinesia, dystonia, or tics  Thought Process:  logical, linear, and goal oriented  Associations:  no loose associations  Thought Content:  no evidence of suicidal ideation or homicidal ideation, no auditory hallucinations present, no visual hallucinations present, and some paranoia present but patient with good insight and able to reality check  Insight:  good  Judgement:  intact  Oriented to:  time, person, and place  Attention Span and Concentration:  intact  Recent and Remote Memory:  intact  Language: able to name/identify objects without impairment  Fund of Knowledge: intact with awareness of current and past events    ED Course     ED Course as of 06/30/24 1726   Sun Jun 30, 2024   1039 I obtained history and examined the patient as noted above. We discussed plans to transfer to [facility] and the patient is comfortable with this plan.       Labs Ordered and Resulted from Time of ED Arrival to Time of ED Departure - No data to display    Assessments & Plan (with Medical Decision Making)   Patient presenting with worsening depression, suicidal ideation, paranoia in the context of bipolar disorder and substance abuse.  Patient " with long history of mental health and substance use struggles.  Has recently been using mood altering substances including cannabis, alcohol, cocaine.  Not sleeping well.  Bipolar symptoms have worsened.  Reports taking medications regularly, however, has been out of hydroxyzine which typically helps significantly with sleep and reduced anxiety.  Hydroxyzine refilled and sent to pharmacy.  Patient also received hydroxyzine while on the unit and found it helpful for decreasing anxiety and helping her with sleep.  No further medication changes made since patient sees psychiatric prescriber outpatient on 7/9.  Discussed monitoring symptoms while avoiding mood altering substances and getting better sleep.  Patient interested in referral to MI/CD IOP.  This referral placed.  Appointment also scheduled for individual psychotherapy.  Patient denies acute safety concerns with requested discharge.  No current suicidal ideation and greatly reduced mental health symptoms.  Patient does not appear psychotic or manic.  Patient with history of tubal ligation.  Nursing notes reviewed noting no acute issues.     I have reviewed the assessment completed by the [facility].     At the time of discharge, the patient's acute suicide risk was determined to be low due to the following factors: Reduction in the intensity of mood/anxiety symptoms that preceded the admission, denial of suicidal thoughts, denies feeling helpless or hopeless, not currently under the influence of alcohol or illicit substances, denies experiencing command hallucinations, no immediate access to firearms. The patient's acute risk could be higher if noncompliant with their treatment plan, medications, follow-up appointments or using illicit substances or alcohol. Protective factors include: social supports, children, stable housing.    Preliminary diagnosis:    ICD-10-CM    1. Bipolar disorder, current episode mixed, severe, with psychotic features (H)  F31.64       2.  "History of substance abuse (H)  F19.11            Treatment Plan:  -Discharge home with family  -Continue lamotrigine 400 mg daily for bipolar depression  -Continue haloperidol/Haldol 4 mg daily for bipolar disorder, history of psychosis  -Continue hydroxyzine 25-50 mg up to 3 times daily as needed for anxiety, sleep.  New prescription sent today since out of the medication at home.  -Recommend sobriety from mood altering substances.  -Referral placed for MI/CD intensive outpatient programming.  -Appointment scheduled for individual psychotherapy.  -Encouraged to continue working with outpatient psychiatric prescriber.  -Medication education provided this visit including but not limited to: Rationale for medication, importance of medication adherence, medication interactions, common medication side effects, benefits of medications.   -Problem focused supportive therapy and education provided today related to patient's current and acute stressors, symptoms, and diagnoses.     --  Krista Nazario DO   [facility] EMERGENCY DEPT  [facility] Unit     This note was created with voice recognition software. Inadvertent grammatical errors, typographical errors, and \"sound-a-like\" substitutions may occur due to limitations of the software.  Read the note carefully and apply context when erroneous substitutions have occurred. Thank you.        Krista Nazario DO  06/30/24 1744    "

## 2024-08-29 ENCOUNTER — APPOINTMENT (OUTPATIENT)
Dept: CT IMAGING | Facility: CLINIC | Age: 29
End: 2024-08-29
Attending: EMERGENCY MEDICINE
Payer: COMMERCIAL

## 2024-08-29 ENCOUNTER — HOSPITAL ENCOUNTER (EMERGENCY)
Facility: CLINIC | Age: 29
Discharge: HOME | End: 2024-08-29
Attending: EMERGENCY MEDICINE | Admitting: EMERGENCY MEDICINE
Payer: COMMERCIAL

## 2024-08-29 VITALS
DIASTOLIC BLOOD PRESSURE: 70 MMHG | TEMPERATURE: 98.2 F | RESPIRATION RATE: 18 BRPM | WEIGHT: 151.68 LBS | BODY MASS INDEX: 25.89 KG/M2 | OXYGEN SATURATION: 98 % | HEART RATE: 71 BPM | HEIGHT: 64 IN | SYSTOLIC BLOOD PRESSURE: 110 MMHG

## 2024-08-29 DIAGNOSIS — R51.9 ACUTE NONINTRACTABLE HEADACHE, UNSPECIFIED HEADACHE TYPE: ICD-10-CM

## 2024-08-29 DIAGNOSIS — B34.9 VIRAL INFECTION: ICD-10-CM

## 2024-08-29 LAB
ALBUMIN SERPL BCG-MCNC: 4.4 G/DL (ref 3.5–5.2)
ALBUMIN UR-MCNC: NEGATIVE MG/DL
ALP SERPL-CCNC: 122 U/L (ref 40–150)
ALT SERPL W P-5'-P-CCNC: 18 U/L (ref 0–50)
ANION GAP SERPL CALCULATED.3IONS-SCNC: 13 MMOL/L (ref 7–15)
APPEARANCE CSF: CLEAR
APPEARANCE UR: CLEAR
AST SERPL W P-5'-P-CCNC: 27 U/L (ref 0–45)
BASOPHILS # BLD AUTO: 0 10E3/UL (ref 0–0.2)
BASOPHILS NFR BLD AUTO: 0 %
BILIRUB SERPL-MCNC: 0.4 MG/DL
BILIRUB UR QL STRIP: NEGATIVE
BUN SERPL-MCNC: 6.6 MG/DL (ref 6–20)
C GATTII+NEOFOR DNA CSF QL NAA+NON-PROBE: NEGATIVE
CALCIUM SERPL-MCNC: 8.8 MG/DL (ref 8.8–10.4)
CHLORIDE SERPL-SCNC: 104 MMOL/L (ref 98–107)
CMV DNA CSF QL NAA+NON-PROBE: NEGATIVE
COLOR CSF: COLORLESS
COLOR UR AUTO: ABNORMAL
CREAT SERPL-MCNC: 0.77 MG/DL (ref 0.51–0.95)
E COLI K1 AG CSF QL: NEGATIVE
EGFRCR SERPLBLD CKD-EPI 2021: >90 ML/MIN/1.73M2
EOSINOPHIL # BLD AUTO: 0.1 10E3/UL (ref 0–0.7)
EOSINOPHIL NFR BLD AUTO: 2 %
ERYTHROCYTE [DISTWIDTH] IN BLOOD BY AUTOMATED COUNT: 11.9 % (ref 10–15)
EV RNA SPEC QL NAA+PROBE: NEGATIVE
FLUAV RNA SPEC QL NAA+PROBE: NEGATIVE
FLUBV RNA RESP QL NAA+PROBE: NEGATIVE
GLUCOSE CSF-MCNC: 48 MG/DL (ref 40–70)
GLUCOSE SERPL-MCNC: 96 MG/DL (ref 70–99)
GLUCOSE UR STRIP-MCNC: NEGATIVE MG/DL
GP B STREP DNA CSF QL NAA+NON-PROBE: NEGATIVE
GROUP A STREP BY PCR: NOT DETECTED
HAEM INFLU DNA CSF QL NAA+NON-PROBE: NEGATIVE
HCG SERPL QL: NEGATIVE
HCO3 SERPL-SCNC: 23 MMOL/L (ref 22–29)
HCT VFR BLD AUTO: 38.6 % (ref 35–47)
HGB BLD-MCNC: 12.7 G/DL (ref 11.7–15.7)
HGB UR QL STRIP: NEGATIVE
HHV6 DNA CSF QL NAA+NON-PROBE: NEGATIVE
HSV1 DNA CSF QL NAA+NON-PROBE: NEGATIVE
HSV2 DNA CSF QL NAA+NON-PROBE: NEGATIVE
IMM GRANULOCYTES # BLD: 0 10E3/UL
IMM GRANULOCYTES NFR BLD: 0 %
KETONES UR STRIP-MCNC: NEGATIVE MG/DL
L MONOCYTOG DNA CSF QL NAA+NON-PROBE: NEGATIVE
LEUKOCYTE ESTERASE UR QL STRIP: NEGATIVE
LYMPHOCYTES # BLD AUTO: 1.7 10E3/UL (ref 0.8–5.3)
LYMPHOCYTES NFR BLD AUTO: 49 %
MCH RBC QN AUTO: 29.7 PG (ref 26.5–33)
MCHC RBC AUTO-ENTMCNC: 32.9 G/DL (ref 31.5–36.5)
MCV RBC AUTO: 90 FL (ref 78–100)
MONOCYTES # BLD AUTO: 0.3 10E3/UL (ref 0–1.3)
MONOCYTES NFR BLD AUTO: 9 %
MUCOUS THREADS #/AREA URNS LPF: PRESENT /LPF
N MEN DNA CSF QL NAA+NON-PROBE: NEGATIVE
NEUTROPHILS # BLD AUTO: 1.4 10E3/UL (ref 1.6–8.3)
NEUTROPHILS NFR BLD AUTO: 40 %
NITRATE UR QL: NEGATIVE
NRBC # BLD AUTO: 0 10E3/UL
NRBC BLD AUTO-RTO: 0 /100
PARECHOVIRUS A RNA CSF QL NAA+NON-PROBE: NEGATIVE
PH UR STRIP: 8 [PH] (ref 5–7)
PLATELET # BLD AUTO: 291 10E3/UL (ref 150–450)
POTASSIUM SERPL-SCNC: 4.1 MMOL/L (ref 3.4–5.3)
PROT CSF-MCNC: 15.3 MG/DL (ref 15–45)
PROT SERPL-MCNC: 7.3 G/DL (ref 6.4–8.3)
RBC # BLD AUTO: 4.27 10E6/UL (ref 3.8–5.2)
RBC # CSF MANUAL: 1 /UL (ref 0–2)
RBC URINE: 0 /HPF
RSV RNA SPEC NAA+PROBE: NEGATIVE
S PNEUM DNA CSF QL NAA+NON-PROBE: NEGATIVE
SARS-COV-2 RNA RESP QL NAA+PROBE: NEGATIVE
SODIUM SERPL-SCNC: 140 MMOL/L (ref 135–145)
SP GR UR STRIP: 1.01 (ref 1–1.03)
SQUAMOUS EPITHELIAL: 2 /HPF
TUBE # CSF: 4
UROBILINOGEN UR STRIP-MCNC: NORMAL MG/DL
VZV DNA CSF QL NAA+NON-PROBE: NEGATIVE
WBC # BLD AUTO: 3.4 10E3/UL (ref 4–11)
WBC # CSF MANUAL: 1 /UL (ref 0–5)
WBC URINE: <1 /HPF

## 2024-08-29 PROCEDURE — 89050 BODY FLUID CELL COUNT: CPT | Performed by: EMERGENCY MEDICINE

## 2024-08-29 PROCEDURE — 36415 COLL VENOUS BLD VENIPUNCTURE: CPT | Performed by: EMERGENCY MEDICINE

## 2024-08-29 PROCEDURE — 85025 COMPLETE CBC W/AUTO DIFF WBC: CPT | Performed by: EMERGENCY MEDICINE

## 2024-08-29 PROCEDURE — 87529 HSV DNA AMP PROBE: CPT | Performed by: EMERGENCY MEDICINE

## 2024-08-29 PROCEDURE — 62270 DX LMBR SPI PNXR: CPT

## 2024-08-29 PROCEDURE — 96361 HYDRATE IV INFUSION ADD-ON: CPT

## 2024-08-29 PROCEDURE — 250N000009 HC RX 250: Performed by: EMERGENCY MEDICINE

## 2024-08-29 PROCEDURE — 86618 LYME DISEASE ANTIBODY: CPT | Performed by: EMERGENCY MEDICINE

## 2024-08-29 PROCEDURE — 82945 GLUCOSE OTHER FLUID: CPT | Performed by: EMERGENCY MEDICINE

## 2024-08-29 PROCEDURE — 250N000011 HC RX IP 250 OP 636: Performed by: EMERGENCY MEDICINE

## 2024-08-29 PROCEDURE — 70450 CT HEAD/BRAIN W/O DYE: CPT

## 2024-08-29 PROCEDURE — 87205 SMEAR GRAM STAIN: CPT | Performed by: EMERGENCY MEDICINE

## 2024-08-29 PROCEDURE — 87651 STREP A DNA AMP PROBE: CPT | Performed by: EMERGENCY MEDICINE

## 2024-08-29 PROCEDURE — 87483 CNS DNA AMP PROBE TYPE 12-25: CPT | Performed by: EMERGENCY MEDICINE

## 2024-08-29 PROCEDURE — 96374 THER/PROPH/DIAG INJ IV PUSH: CPT

## 2024-08-29 PROCEDURE — 99291 CRITICAL CARE FIRST HOUR: CPT | Mod: 25

## 2024-08-29 PROCEDURE — 80053 COMPREHEN METABOLIC PANEL: CPT | Performed by: EMERGENCY MEDICINE

## 2024-08-29 PROCEDURE — 87637 SARSCOV2&INF A&B&RSV AMP PRB: CPT | Performed by: EMERGENCY MEDICINE

## 2024-08-29 PROCEDURE — 84157 ASSAY OF PROTEIN OTHER: CPT | Performed by: EMERGENCY MEDICINE

## 2024-08-29 PROCEDURE — 96375 TX/PRO/DX INJ NEW DRUG ADDON: CPT

## 2024-08-29 PROCEDURE — 84703 CHORIONIC GONADOTROPIN ASSAY: CPT | Performed by: EMERGENCY MEDICINE

## 2024-08-29 PROCEDURE — 87040 BLOOD CULTURE FOR BACTERIA: CPT | Performed by: EMERGENCY MEDICINE

## 2024-08-29 PROCEDURE — 81003 URINALYSIS AUTO W/O SCOPE: CPT | Performed by: EMERGENCY MEDICINE

## 2024-08-29 PROCEDURE — 258N000003 HC RX IP 258 OP 636: Performed by: EMERGENCY MEDICINE

## 2024-08-29 RX ORDER — MORPHINE SULFATE 4 MG/ML
4 INJECTION, SOLUTION INTRAMUSCULAR; INTRAVENOUS
Status: COMPLETED | OUTPATIENT
Start: 2024-08-29 | End: 2024-08-29

## 2024-08-29 RX ORDER — ONDANSETRON 4 MG/1
4 TABLET, ORALLY DISINTEGRATING ORAL EVERY 8 HOURS PRN
Qty: 10 TABLET | Refills: 0 | Status: SHIPPED | OUTPATIENT
Start: 2024-08-29 | End: 2024-09-01

## 2024-08-29 RX ORDER — METOCLOPRAMIDE HYDROCHLORIDE 5 MG/ML
10 INJECTION INTRAMUSCULAR; INTRAVENOUS ONCE
Status: COMPLETED | OUTPATIENT
Start: 2024-08-29 | End: 2024-08-29

## 2024-08-29 RX ORDER — KETOROLAC TROMETHAMINE 15 MG/ML
15 INJECTION, SOLUTION INTRAMUSCULAR; INTRAVENOUS ONCE
Status: COMPLETED | OUTPATIENT
Start: 2024-08-29 | End: 2024-08-29

## 2024-08-29 RX ORDER — LIDOCAINE 40 MG/G
CREAM TOPICAL
Status: COMPLETED
Start: 2024-08-29 | End: 2024-08-29

## 2024-08-29 RX ORDER — ONDANSETRON 2 MG/ML
4 INJECTION INTRAMUSCULAR; INTRAVENOUS EVERY 30 MIN PRN
Status: DISCONTINUED | OUTPATIENT
Start: 2024-08-29 | End: 2024-08-29 | Stop reason: HOSPADM

## 2024-08-29 RX ORDER — LIDOCAINE 40 MG/G
CREAM TOPICAL ONCE
Status: COMPLETED | OUTPATIENT
Start: 2024-08-29 | End: 2024-08-29

## 2024-08-29 RX ADMIN — MORPHINE SULFATE 4 MG: 4 INJECTION, SOLUTION INTRAMUSCULAR; INTRAVENOUS at 13:44

## 2024-08-29 RX ADMIN — SODIUM CHLORIDE 1000 ML: 9 INJECTION, SOLUTION INTRAVENOUS at 11:43

## 2024-08-29 RX ADMIN — LIDOCAINE: 40 CREAM TOPICAL at 14:05

## 2024-08-29 RX ADMIN — METOCLOPRAMIDE 10 MG: 5 INJECTION, SOLUTION INTRAMUSCULAR; INTRAVENOUS at 11:43

## 2024-08-29 RX ADMIN — KETOROLAC TROMETHAMINE 15 MG: 15 INJECTION, SOLUTION INTRAMUSCULAR; INTRAVENOUS at 12:25

## 2024-08-29 RX ADMIN — ONDANSETRON 4 MG: 2 INJECTION INTRAMUSCULAR; INTRAVENOUS at 13:43

## 2024-08-29 ASSESSMENT — ACTIVITIES OF DAILY LIVING (ADL)
ADLS_ACUITY_SCORE: 35

## 2024-08-29 ASSESSMENT — COLUMBIA-SUICIDE SEVERITY RATING SCALE - C-SSRS
6. HAVE YOU EVER DONE ANYTHING, STARTED TO DO ANYTHING, OR PREPARED TO DO ANYTHING TO END YOUR LIFE?: NO
2. HAVE YOU ACTUALLY HAD ANY THOUGHTS OF KILLING YOURSELF IN THE PAST MONTH?: NO
1. IN THE PAST MONTH, HAVE YOU WISHED YOU WERE DEAD OR WISHED YOU COULD GO TO SLEEP AND NOT WAKE UP?: NO

## 2024-08-29 NOTE — DISCHARGE INSTRUCTIONS
Motrin 600mg every 6 hours  Tylenol 1000mg every 6 hours  Rest and push fluids  Recheck with your doctor next week  Return if worsening symptoms

## 2024-08-29 NOTE — ED PROVIDER NOTES
"  Emergency Department Note      History of Present Illness     Chief Complaint   Headache      HPI   Jenniffer Young is a 29 year old female who presents to the ED with a headache. The patient reports she has had night sweats for the last 2 days and when she bends over she gets a bilateral headache that goes into her neck and upper back that has persisted. She reports she began experiencing diarrhea and nausea yesterday. She states she took tylenol or ibuprofen yesterday but not today. She also endorses fatigue and rash on hands and feet. She denies cough, rhinorrhea, sore throat, congestion, recent travel, recent camping/tick exposure, or recent exposure to illness. She states she has not checked for fever. She notes she took a COVID-19 test last week that was negative since she had symptoms of sore throat and cough that has subsided. She states she takes bipolar medications regularly. She also notes she was started on hydroxyzine for anxiety a month ago. She denies any pressing medical problems.     Independent Historian   None    Review of External Notes   none    Past Medical History     Medical History and Problem List   Anxiety disorder  Bipolar affective disorder  Depression  Mariajuana dependence  Psychosis  Sepsis    Medications   Albuterol   Fluticasone   Haloperidol   Hydroxyzine  Lamotrigine  Ondansetron   Prednisone    Surgical History   Esophagoscopy, gastroscopy, duodenoscopy (egd), combned  Pr lap diagnostic abdomen  Tonsillectomy    Physical Exam     Patient Vitals for the past 24 hrs:   BP Temp Temp src Pulse Resp SpO2 Height Weight   08/29/24 1102 131/86 98.2  F (36.8  C) Temporal 86 18 100 % 1.626 m (5' 4\") 68.8 kg (151 lb 10.8 oz)     Physical Exam  Constitutional:       Appearance: She is well-developed.   HENT:      Right Ear: Tympanic membrane and external ear normal.      Left Ear: Tympanic membrane and external ear normal.      Mouth/Throat:      Mouth: Mucous membranes are moist.      " Pharynx: Oropharynx is clear. No oropharyngeal exudate or posterior oropharyngeal erythema.   Eyes:      General: No scleral icterus.     Extraocular Movements: Extraocular movements intact.      Conjunctiva/sclera: Conjunctivae normal.      Pupils: Pupils are equal, round, and reactive to light.   Neck:      Comments: Posterior neck with diffuse pain on exam  Cardiovascular:      Rate and Rhythm: Normal rate and regular rhythm.      Heart sounds: Normal heart sounds. No murmur heard.     No friction rub. No gallop.   Pulmonary:      Effort: Pulmonary effort is normal. No respiratory distress.      Breath sounds: Normal breath sounds. No stridor. No wheezing, rhonchi or rales.   Abdominal:      General: Bowel sounds are normal. There is no distension.      Palpations: Abdomen is soft. There is no mass.      Tenderness: There is no abdominal tenderness.   Musculoskeletal:         General: Normal range of motion.      Cervical back: Normal range of motion and neck supple. Tenderness present. No rigidity.   Lymphadenopathy:      Cervical: No cervical adenopathy.   Skin:     General: Skin is warm and dry.      Capillary Refill: Capillary refill takes less than 2 seconds.      Findings: Rash present.      Comments: B palms and B great toes with faint round nontender and nonpruritic erythematous rash   Neurological:      General: No focal deficit present.      Mental Status: She is alert.      Cranial Nerves: No cranial nerve deficit.      Motor: No weakness.           Diagnostics     Lab Results   Labs Ordered and Resulted from Time of ED Arrival to Time of ED Departure   ROUTINE UA WITH MICROSCOPIC REFLEX TO CULTURE - Abnormal       Result Value    Color Urine Light Yellow      Appearance Urine Clear      Glucose Urine Negative      Bilirubin Urine Negative      Ketones Urine Negative      Specific Gravity Urine 1.015      Blood Urine Negative      pH Urine 8.0 (*)     Protein Albumin Urine Negative      Urobilinogen  Urine Normal      Nitrite Urine Negative      Leukocyte Esterase Urine Negative      Mucus Urine Present (*)     RBC Urine 0      WBC Urine <1      Squamous Epithelials Urine 2 (*)    CBC WITH PLATELETS AND DIFFERENTIAL - Abnormal    WBC Count 3.4 (*)     RBC Count 4.27      Hemoglobin 12.7      Hematocrit 38.6      MCV 90      MCH 29.7      MCHC 32.9      RDW 11.9      Platelet Count 291      % Neutrophils 40      % Lymphocytes 49      % Monocytes 9      % Eosinophils 2      % Basophils 0      % Immature Granulocytes 0      NRBCs per 100 WBC 0      Absolute Neutrophils 1.4 (*)     Absolute Lymphocytes 1.7      Absolute Monocytes 0.3      Absolute Eosinophils 0.1      Absolute Basophils 0.0      Absolute Immature Granulocytes 0.0      Absolute NRBCs 0.0     COMPREHENSIVE METABOLIC PANEL - Normal    Sodium 140      Potassium 4.1      Carbon Dioxide (CO2) 23      Anion Gap 13      Urea Nitrogen 6.6      Creatinine 0.77      GFR Estimate >90      Calcium 8.8      Chloride 104      Glucose 96      Alkaline Phosphatase 122      AST 27      ALT 18      Protein Total 7.3      Albumin 4.4      Bilirubin Total 0.4     HCG QUALITATIVE PREGNANCY - Normal    hCG Serum Qualitative Negative     INFLUENZA A/B, RSV, & SARS-COV2 PCR - Normal    Influenza A PCR Negative      Influenza B PCR Negative      RSV PCR Negative      SARS CoV2 PCR Negative     GROUP A STREPTOCOCCUS PCR THROAT SWAB - Normal    Group A strep by PCR Not Detected     CELL COUNT CSF    Tube Number 4      Color Colorless      Clarity Clear      Total Nucleated Cells 1      RBC Count 1     LYME DISEASE TOTAL ANTIBODIES WITH REFLEX TO CONFIRMATION   BLOOD CULTURE   GRAM STAIN   AEROBIC BACTERIAL CULTURE ROUTINE   HERPES SIMPLEX VIRUS 1&2 PCR   MENINGITIS/ENCEPHALITIS PANEL QUAL PCR CSF   CELL COUNT WITH DIFFERENTIAL CSF       Imaging   CT Head w/o Contrast   Final Result   IMPRESSION: No CT findings of acute intracranial process.      APRIL MORALES MD             SYSTEM ID:  [id number]            Independent Interpretation   None    ED Course      Medications Administered   Medications   ondansetron (ZOFRAN) injection 4 mg (4 mg Intravenous $Given 8/29/24 1343)   sodium chloride 0.9% BOLUS 1,000 mL (0 mLs Intravenous Stopped 8/29/24 1225)   ketorolac (TORADOL) injection 15 mg (15 mg Intravenous $Given 8/29/24 1225)   metoclopramide (REGLAN) injection 10 mg (10 mg Intravenous $Given 8/29/24 1143)   morphine (PF) injection 4 mg (4 mg Intravenous $Given 8/29/24 1344)   lidocaine (LMX4) cream ( Topical $Given 8/29/24 1405)       Procedures     Lumbar Puncture      Procedure: Lumbar Puncture      Indication: headache     Consent: Written from Patient  Risks Discussed (including but not limited to): Infection, Bleeding, Spinal headache with possibility of spinal patch, and Temporary or permanent neurological injury     Universal Protocol: Universal protocol was followed and time out conducted just prior to starting procedure, confirming patient identity, site/side, procedure, patient position, and availability of correct equipment and implants.      Anesthesia/Sedation: Lidocaine - 1%     Procedure Note:     Patient was placed in a sitting position.  The skin overlying the L4-5 area was prepped with povidone-iodine.    The patient was medicated as above.   A 20 gauge spinal needle was used to gain access to the subarachnoid space with stylet in place.   Opening pressure was not measured.   The fluid was clear.   Stylet was replaced and needle withdrawn.      Patient Status:  The patient tolerated the procedure well: Yes. There were no complications.     Discussion of Management   None    ED Course   ED Course as of 08/29/24 1348   Thu Aug 29, 2024   1120 I obtained history and examined the patient as noted above.   1337 I rechecked the patient and explained findings.       Additional Documentation  None    Medical Decision Making / Diagnosis     CMS Diagnoses: None    MIPS        None    [facility]   Jenniffer Young is a 29 year old female who presents with headache, neck pain, fevers and other infectious symptoms.  COVID and influenza swabs are negative.  No other source of infection.  She does have mild leukopenia which would be more suggestive of viral infection.  Given her ongoing head and neck pain, we did discuss performing lumbar puncture to rule out meningitis.  Patient consented to lumbar puncture.  Thankfully the results did not show evidence of meningitis.  Cultures pending but cell count was in the normal range.  She is doing better after medication.  She tolerated p.o. challenge.  Patient preferred to go home.  We discussed using Tylenol and ibuprofen for now.  She is given a work note for next several days off.  I advised her not to lift her do anything strenuous work next 24 hours given that she just recently had a lumbar puncture.  She agrees to follow-up with her doctor next week and return if symptoms worsen.  She does understand there are still cultures pending and she may receive a call if they are positive.  Most likely this is a viral etiology and will resolve over the next 1 to 10 days.    Disposition   The patient was discharged.     Diagnosis     ICD-10-CM    1. Viral infection  B34.9       2. Acute nonintractable headache, unspecified headache type  R51.9            Discharge Medications   Discharge Medication List as of 8/29/2024  4:53 PM            Scribe Disclosure:  I, Jer Coker, am serving as a scribe at 11:24 AM on 8/29/2024 to document services personally performed by Moris Steen MD based on my observations and the provider's statements to me.        Moris Steen MD  08/29/24 3270

## 2024-08-29 NOTE — ED TRIAGE NOTES
Pt arrives ambulatory for headache for around 5 days and new onset chills last night, weakness, lethargy, and rash on hands and feet. Headache front of head and back of neck. Worse with coughing or bending over. New onset shakiness this morning.

## 2024-08-29 NOTE — Clinical Note
Jenniffer Young was seen and treated in our emergency department on 8/29/2024.  She may return to work on 09/02/2024.       If you have any questions or concerns, please don't hesitate to call.      Moris Steen MD

## 2024-08-30 LAB
B BURGDOR IGG+IGM SER QL: 0.1
HSV1 DNA CSF QL NAA+PROBE: NOT DETECTED
HSV2 DNA CSF QL NAA+PROBE: NOT DETECTED

## 2024-09-01 ENCOUNTER — TELEPHONE (OUTPATIENT)
Dept: NURSING | Facility: CLINIC | Age: 29
End: 2024-09-01
Payer: COMMERCIAL

## 2024-09-01 NOTE — TELEPHONE ENCOUNTER
Patient left a voice mail at 6:40pm on 8/31/24 requesting lab results from ED visit on 8/29/24. Called patient at 9:13am on 9/1/24 to review results, but call went to voicemail. Message left to call back.    Peggy Arroyo RN  09/01/24 9:17 AM  [facility]  Emergency Department Lab Results RN

## 2024-09-03 ENCOUNTER — TELEPHONE (OUTPATIENT)
Dept: NURSING | Facility: CLINIC | Age: 29
End: 2024-09-03
Payer: COMMERCIAL

## 2024-09-03 LAB
BACTERIA BLD CULT: NO GROWTH
BACTERIA CSF CULT: NO GROWTH
GRAM STAIN RESULT: NORMAL
GRAM STAIN RESULT: NORMAL

## 2024-09-03 NOTE — TELEPHONE ENCOUNTER
Pt calling that she looked at her test results 8/29/24 and states she is not feeling much better.  Reviewed labs with patient and advised pt to follow up with PCP per AVS and to go back to ED if symptoms worse.  Mihaela Fontanez RN  Emergency Department Results Team

## 2024-09-26 ENCOUNTER — HOSPITAL ENCOUNTER (EMERGENCY)
Facility: CLINIC | Age: 29
Discharge: HOME | End: 2024-09-26
Attending: EMERGENCY MEDICINE | Admitting: EMERGENCY MEDICINE
Payer: COMMERCIAL

## 2024-09-26 VITALS
TEMPERATURE: 98.4 F | HEIGHT: 64 IN | HEART RATE: 85 BPM | BODY MASS INDEX: 26.04 KG/M2 | DIASTOLIC BLOOD PRESSURE: 81 MMHG | OXYGEN SATURATION: 99 % | RESPIRATION RATE: 18 BRPM | SYSTOLIC BLOOD PRESSURE: 119 MMHG

## 2024-09-26 DIAGNOSIS — F31.9 BIPOLAR 1 DISORDER (H): Primary | ICD-10-CM

## 2024-09-26 DIAGNOSIS — F10.10 ALCOHOL ABUSE: ICD-10-CM

## 2024-09-26 DIAGNOSIS — F48.9 MENTAL HEALTH PROBLEM: ICD-10-CM

## 2024-09-26 DIAGNOSIS — F12.20 MARIJUANA DEPENDENCE (H): ICD-10-CM

## 2024-09-26 LAB
AMPHETAMINES UR QL SCN: ABNORMAL
BARBITURATES UR QL SCN: ABNORMAL
BENZODIAZ UR QL SCN: ABNORMAL
BZE UR QL SCN: ABNORMAL
CANNABINOIDS UR QL SCN: ABNORMAL
FENTANYL UR QL: ABNORMAL
HCG UR QL: NEGATIVE
OPIATES UR QL SCN: ABNORMAL
PCP QUAL URINE (ROCHE): ABNORMAL

## 2024-09-26 PROCEDURE — 81025 URINE PREGNANCY TEST: CPT | Performed by: PSYCHIATRY & NEUROLOGY

## 2024-09-26 PROCEDURE — 250N000013 HC RX MED GY IP 250 OP 250 PS 637: Performed by: PSYCHIATRY & NEUROLOGY

## 2024-09-26 PROCEDURE — 80307 DRUG TEST PRSMV CHEM ANLYZR: CPT | Performed by: PSYCHIATRY & NEUROLOGY

## 2024-09-26 PROCEDURE — 99204 OFFICE O/P NEW MOD 45 MIN: CPT | Performed by: NURSE PRACTITIONER

## 2024-09-26 PROCEDURE — 99283 EMERGENCY DEPT VISIT LOW MDM: CPT

## 2024-09-26 PROCEDURE — 250N000013 HC RX MED GY IP 250 OP 250 PS 637: Performed by: NURSE PRACTITIONER

## 2024-09-26 RX ORDER — HYDROXYZINE HYDROCHLORIDE 50 MG/1
50-100 TABLET, FILM COATED ORAL 3 TIMES DAILY PRN
Status: DISCONTINUED | OUTPATIENT
Start: 2024-09-26 | End: 2024-09-26 | Stop reason: HOSPADM

## 2024-09-26 RX ORDER — ACETAMINOPHEN 325 MG/1
650 TABLET ORAL EVERY 6 HOURS PRN
Status: DISCONTINUED | OUTPATIENT
Start: 2024-09-26 | End: 2024-09-26 | Stop reason: HOSPADM

## 2024-09-26 RX ADMIN — NICOTINE POLACRILEX 2 MG: 2 GUM, CHEWING BUCCAL at 20:38

## 2024-09-26 RX ADMIN — NICOTINE POLACRILEX 2 MG: 2 GUM, CHEWING BUCCAL at 12:28

## 2024-09-26 RX ADMIN — NICOTINE POLACRILEX 2 MG: 2 GUM, CHEWING BUCCAL at 11:03

## 2024-09-26 RX ADMIN — NICOTINE POLACRILEX 2 MG: 2 GUM, CHEWING BUCCAL at 16:14

## 2024-09-26 RX ADMIN — NICOTINE POLACRILEX 2 MG: 2 GUM, CHEWING BUCCAL at 14:33

## 2024-09-26 RX ADMIN — NICOTINE POLACRILEX 2 MG: 2 GUM, CHEWING BUCCAL at 17:38

## 2024-09-26 RX ADMIN — HYDROXYZINE HYDROCHLORIDE 100 MG: 50 TABLET, FILM COATED ORAL at 10:59

## 2024-09-26 RX ADMIN — ACETAMINOPHEN 650 MG: 325 TABLET ORAL at 17:38

## 2024-09-26 ASSESSMENT — COLUMBIA-SUICIDE SEVERITY RATING SCALE - C-SSRS
5. HAVE YOU STARTED TO WORK OUT OR WORKED OUT THE DETAILS OF HOW TO KILL YOURSELF? DO YOU INTEND TO CARRY OUT THIS PLAN?: NO
3. HAVE YOU BEEN THINKING ABOUT HOW YOU MIGHT KILL YOURSELF?: NO
2. HAVE YOU ACTUALLY HAD ANY THOUGHTS OF KILLING YOURSELF IN THE PAST MONTH?: YES
ATTEMPT LIFETIME: NO
2. HAVE YOU ACTUALLY HAD ANY THOUGHTS OF KILLING YOURSELF?: YES
TOTAL  NUMBER OF INTERRUPTED ATTEMPTS LIFETIME: NO
4. HAVE YOU HAD THESE THOUGHTS AND HAD SOME INTENTION OF ACTING ON THEM?: YES
1. HAVE YOU WISHED YOU WERE DEAD OR WISHED YOU COULD GO TO SLEEP AND NOT WAKE UP?: YES
6. HAVE YOU EVER DONE ANYTHING, STARTED TO DO ANYTHING, OR PREPARED TO DO ANYTHING TO END YOUR LIFE?: NO
2. HAVE YOU ACTUALLY HAD ANY THOUGHTS OF KILLING YOURSELF?: YES
1. IN THE PAST MONTH, HAVE YOU WISHED YOU WERE DEAD OR WISHED YOU COULD GO TO SLEEP AND NOT WAKE UP?: YES
1. IN THE PAST MONTH, HAVE YOU WISHED YOU WERE DEAD OR WISHED YOU COULD GO TO SLEEP AND NOT WAKE UP?: YES
5. HAVE YOU STARTED TO WORK OUT OR WORKED OUT THE DETAILS OF HOW TO KILL YOURSELF? DO YOU INTEND TO CARRY OUT THIS PLAN?: NO
3. HAVE YOU BEEN THINKING ABOUT HOW YOU MIGHT KILL YOURSELF?: NO
4. HAVE YOU HAD THESE THOUGHTS AND HAD SOME INTENTION OF ACTING ON THEM?: NO
5. HAVE YOU STARTED TO WORK OUT OR WORKED OUT THE DETAILS OF HOW TO KILL YOURSELF? DO YOU INTEND TO CARRY OUT THIS PLAN?: NO
TOTAL  NUMBER OF ABORTED OR SELF INTERRUPTED ATTEMPTS LIFETIME: NO
6. HAVE YOU EVER DONE ANYTHING, STARTED TO DO ANYTHING, OR PREPARED TO DO ANYTHING TO END YOUR LIFE?: NO
REASONS FOR IDEATION PAST MONTH: MOSTLY TO END OR STOP THE PAIN (YOU COULDN'T GO ON LIVING WITH THE PAIN OR HOW YOU WERE FEELING)
4. HAVE YOU HAD THESE THOUGHTS AND HAD SOME INTENTION OF ACTING ON THEM?: NO

## 2024-09-26 ASSESSMENT — ACTIVITIES OF DAILY LIVING (ADL)
ADLS_ACUITY_SCORE: 35
ADLS_ACUITY_SCORE: 33
ADLS_ACUITY_SCORE: 35

## 2024-09-26 NOTE — ED PROVIDER NOTES
"  Emergency Department Note      History of Present Illness     Chief Complaint  Mental Health Problem    HPI  Jenniffer Young is a 29 year old female who presents to the emergency room with what appears to be mental health related concerns.  He has a history of bipolar she states, and has not been able to get her medications due to some issues with the pharmacies.  She states that she has been drinking alcohol but states that she still very much wants to get help and comes here specifically go to [facility].  Denies any medical concerns, no chest pain abdominal pain, has not taken any steps to harm her self.      Independent Historian  No    Review of External Notes  Yes I have reviewed the patient's last ER visit at an outside hospital on August 29, 2024 with patient was seen for headache and a viral infection.      Past Medical History   Medical History and Problem List  Past Medical History:   Diagnosis Date    Anxiety disorder        Medications  acetaminophen (TYLENOL) 500 MG tablet  albuterol (PROAIR HFA/PROVENTIL HFA/VENTOLIN HFA) 108 (90 BASE) MCG/ACT Inhaler  albuterol (PROVENTIL HFA) 108 (90 Base) MCG/ACT inhaler  fluticasone (FLOVENT HFA) 220 MCG/ACT inhaler  haloperidol (HALDOL) 2 MG tablet  hydrOXYzine HCl (ATARAX) 50 MG tablet  lamoTRIgine (LAMICTAL) 200 MG tablet  predniSONE (DELTASONE) 20 MG tablet        Surgical History   Past Surgical History:   Procedure Laterality Date    ESOPHAGOSCOPY, GASTROSCOPY, DUODENOSCOPY (EGD), COMBINED      2010 - found \"bile\" in stomach.    OTHER SURGICAL HISTORY      [id number],NO PREVIOUS SURGERY    OTHER SURGICAL HISTORY      12/14/2015,74992.0,FL LAP DIAGNOSTIC ABDOMEN    TONSILLECTOMY      No Comments Provided         Physical Exam   Patient Vitals for the past 24 hrs:   BP Temp Temp src Resp   09/26/24 0932 125/80 97.9  F (36.6  C) Temporal 16       Physical Exam  Vitals: reviewed by me  General: Pt seen on [facility], pleasant, cooperative, and alert to " conversation  Eyes: Tracking well, clear conjunctiva BL  ENT: MMM, midline trachea.   Lungs: No tachypnea, no accessory muscle use. No respiratory distress.   CV: Rate as above  MSK: no joint effusion.  No evidence of trauma  Skin: No rash  Neuro: Clear speech and no facial droop.  Psych: Not RIS, no e/o AH/        ED Course          Discussion of Management   Yes I have requested a formal mental health assessment from our DEC       Optional/Additional Documentation  Stress/Adjustment Disorders      Medical Decision Making / Diagnosis           MDM  This is a very pleasant 29-year-old female who presents to the emergency room with a mental health related concern.  She would like to be evaluated to be reinitiated on some of her bipolar medications, and I do think that [facility] is the next best step for her.  She is medically cleared at this time, has a normal physical exam, normal vital signs and highly reliable appearing.    ICD-10 Codes:    ICD-10-CM    1. Mental health problem  F48.9              Discharge Medications  New Prescriptions    No medications on file                  Darshan Lewis MD  09/26/24 1012

## 2024-09-26 NOTE — PLAN OF CARE
Jenniffer Young  September 26, 2024  Plan of Care Hand-off Note     Patient Care Path: observation    Plan for Care:   At this time it does appear that Pt would benefit from further observation and psychiatric stabilization via medication management and ED level therapeutic interventions, due to medication noncompliance, identified manic symptoms by Pt, and wanting services regarding ARIEL. Pt was not able to fully safety plan with writer. Pt does appear to be at higher risk of death by suicide accidental or intentional due to mental health hx and substance use sx.        Identified Goals and Safety Issues:  substance abuse and suicidal ideation    Overview:  Recommendation of Pt meeting with attending psychiatric provider to discuss medication management and engaging in ARIEL assessment. Assist with potentially scheduling outpatient therapy for Pt prior to anticipated discharge on 09/27/2024. If Pt is able to effectively safety plan and/or Pts sx improve it would be beneficial to pursue a less restrictive alternative than remaining in the hospital.          Legal Status: Legal Status at Admission: Voluntary/Patient has signed consent for treatment    [facility]       Anusha RN regarding plan of care.           Tom Salazar [facility]

## 2024-09-26 NOTE — DISCHARGE INSTRUCTIONS
Scheduled Appointment(s):    Type: In-Person Therapy   Provider: Corrine SANTIAGO  LICSW    Location: [address] [facility]   Phone: [phone]   Date/Time: 9/30/2024, 11:00 am     - - - - - - - - - - - - - - - - - - - -     Consider discussing with your outpatient psychiatry provider switching from Haldol to aripiprazole (Abilify), or better yet and covered by your insurance brexpiprazole (Rexulti).    Follow up with your appointments as scheduled.    Below are 3 places you can complete an assessment and participate in Outpatient Treatment in your community(within 5 miles of her address.  If you decided to begin ARIEL treatment you should call one of the programs below to schedule an assessment to begin treatment.      [facility]  [id number] [place] [address] [phone]     [employer]  [address] [place] [id number] [phone]     [facility] [address] [place], [place] [address] [phone]

## 2024-09-26 NOTE — ED NOTES
[facility]  ED to [facility] Checklist:      Goal for [facility]: Depression management, Substance use,  Referral and resources, and Time to stabilize    Current Behavior: Sad    Safety Concerns: None    Legal Hold Status: Voluntary    Medically Cleared by ED provider: Yes    Patient Therapeutically Searched: Therapeutic search by ED staff (strings, belts, shoes, pockets, electronics, etc.)    Belongings: Remain with patient    Independent Ambulation at Baseline: Yes/No: Yes    Participates in Care/Conversation: Yes/No: Yes    Patient Informed about [facility]: Yes/No: Yes    DEC: Ordered and pending    Patient Ready to be Transferred to [facility]? Yes/No: Yes

## 2024-09-26 NOTE — PROGRESS NOTES
Pt transferred over from to [facility] from ED 18. Pt was tearful upon intake. She is hoping to have her medications adjusted and she is hoping to get into CD treatment. Pt reports she is addicted to etoh and she has been drinking daily. Pt also reports cannabis use. Pt reports struggling with gambling addiction as well. She endorses passive SI. Denies any hallucinations.   . Nursing and risk assessments completed. Assessments reviewed with [facility] and physician. Admission information reviewed with patient. Patient given a tour of [facility] and instructions on using the facility. Questions regarding [facility] addressed. Pt safety search completed.

## 2024-09-26 NOTE — ED TRIAGE NOTES
Pt comes in with hopes of going to [facility]. Pt states has bipolar and has missed several doses of haldol and lamotrigine this week due to pharmacy error. States substantial drinking this week. Appears disheveled in triage with no shoes. Stats feels suicidal.      Triage Assessment (Adult)       Row Name 09/26/24 0934          Triage Assessment    Airway WDL WDL        Respiratory WDL    Respiratory WDL WDL        Skin Circulation/Temperature WDL    Skin Circulation/Temperature WDL WDL        Cardiac WDL    Cardiac WDL WDL        Peripheral/Neurovascular WDL    Peripheral Neurovascular WDL WDL        Cognitive/Neuro/Behavioral WDL    Cognitive/Neuro/Behavioral WDL WDL

## 2024-09-26 NOTE — CONSULTS
"Diagnostic Evaluation Consultation  Crisis Assessment    Patient Name: Jenniffer Young  Age:  29 year old  Legal Sex: female  Gender Identity: female  Pronouns:   Race: White  Ethnicity: Not  or   Language: English      Patient was assessed: In person   Crisis Assessment Start Date: 09/26/24  Crisis Assessment Start Time: 1200  Crisis Assessment Stop Time: 1217  Patient location: [facility] EMERGENCY DEPT                             [id number]    Referral Data and Chief Complaint  Jenniffer Young presents to the ED by  self. Patient is presenting to the ED for the following concerns: Suicidal ideation, Substance use, Worsening psychosocial stress.   Factors that make the mental health crisis life threatening or complex are:  Pt presents to the ED with concern of missing her bipolar medication for the past few days due to lack of access to her medication because of the pharmacy did not have her medication. Pt also reports she has been drinking on a daily basis, throughout the entire day for the past few months. Collateral information identifies she has increased in her gambling as well. Pt reports she came in after waking up today and feeling like she was unable \"to mom\" and needed help given alcohol use, lack of medication compliance, and suicidal ideation. Pt reports feeling as though her mind has been \"frazzled.\" Pt also identifies sleep disturbance. When discussing suicidal ideation, Pt reports she has been having active suicidal thoughts, yet denied any method, plans, or intent. Pt denies any history of suicide attempts. Pt denies any HI, AH/VH. Pt reports she is working with a psychiatrist in the community yet missed their last appointment. Pt also reports looking for help with her alcohol use as well. Pt reports she has been overusing her Hydroxyzine to assist with sleep and hs been smoking marijuana to help with her anxiety. Pt does identify she may be experiencing a level of " niko.      Informed Consent and Assessment Methods  Explained the crisis assessment process, including applicable information disclosures and limits to confidentiality, assessed understanding of the process, and obtained consent to proceed with the assessment.  Assessment methods included conducting a formal interview with patient, review of medical records, collaboration with medical staff, and obtaining relevant collateral information from family and community providers when available.  : done     Patient response to interventions: acceptance expressed, verbalizes understanding  Coping skills were attempted to reduce the crisis:  Voluntarily presented to the ED.     History of the Crisis   Pt carries a diagnosis of bipolar I and was hospitalized at [facility] in December 2015 for an episode of niko with psychotic features. Pt reports she has been drinking daily for the past few months and reports she was not taking her medication for 3-4 nights yet did take her medication last night.    Brief Psychosocial History  Family:  , Children yes (children 5 and 2.5)  Support System:  , Parent(s)  Employment Status:  employed full-time, other (see comments) (massage therapist)  Source of Income:  salary/wages  Financial Environmental Concerns:  none  Current Hobbies:  family functions  Barriers in Personal Life:  mental health concerns, emotional concerns    Significant Clinical History  Current Anxiety Symptoms:  anxious  Current Depression/Trauma:  sense of doom, difficulty concentrating, low self esteem, thoughts of death/suicide, excessive guilt  Current Somatic Symptoms:  anxious  Current Psychosis/Thought Disturbance:     Current Eating Symptoms:  loss of appetite  Chemical Use History:  Alcohol: Daily  Last Use:: 09/26/24  Benzodiazepines: None  Opiates: None  Marijuana: Daily  Other Use: None  Addictions: Gambling, Other (comment) (Information from RN reports increase in gambling)   Past diagnosis:   Bipolar Disorder  Family history:  No known history of mental health or chemical health concerns  Past treatment:  Individual therapy, Psychiatric Medication Management, Inpatient Hospitalization, Primary Care  Details of most recent treatment:  Pt has an established psychiatrist.  Other relevant history:          Collateral Information  Is there collateral information: Yes     Collateral information name, relationship, phone number:  Keyana Young, mother, [phone]    What happened today: Started this morning with a backache, was feeling tired, and hasn't been on her meds for a while due to overuse.     What is different about patient's functioning: Could tell she has been using substances.     Concern about alcohol/drug use:  yes, using alcohol and marijuana.     What do you think the patient needs:  to get restarted with therapy and use her medication as prescribed.    Has patient made comments about wanting to kill themselves/others: no    If d/c is recommended, can they take part in safety/aftercare planning:  yes    Additional collateral information:        Risk Assessment  [place] Suicide Severity Rating Scale Full Clinical Version:  Suicidal Ideation  3. Active Suicidal Ideation with any Methods (Not Plan) Without Intent to Act (Lifetime): No  Q4 Active Suicidal Ideation with Some Intent to Act, Without Specific Plan (Lifetime): No  Q5 Active Suicidal Ideation with Specific Plan and Intent (Lifetime): No  Q6 Suicide Behavior (Lifetime): no     Suicidal Behavior (Lifetime)  Actual Attempt (Lifetime): No  Has subject engaged in non-suicidal self-injurious behavior? (Lifetime): No  Interrupted Attempts (Lifetime): No  Aborted or Self-Interrupted Attempt (Lifetime): No  Preparatory Acts or Behavior (Lifetime): No    [place] Suicide Severity Rating Scale Recent:   Suicidal Ideation (Recent)  Q1 Wished to be Dead (Past Month): yes  Q2 Suicidal Thoughts (Past Month): no  Q3 Suicidal Thought Method: no  Q4  Suicidal Intent without Specific Plan: yes  Q5 Suicide Intent with Specific Plan: no  Level of Risk per Screen: high risk  Intensity of Ideation (Recent)  Most Severe Ideation Rating (Past 1 Month): 4  Frequency (Past 1 Month): 2-5 times in week  Duration (Past 1 Month): Less than 1 hour/some of the time  Controllability (Past 1 Month): Can control thoughts with little difficulty  Deterrents (Past 1 Month): Deterrents definitely stopped you from attempting suicide  Reasons for Ideation (Past 1 Month): Mostly to end or stop the pain (You couldn't go on living with the pain or how you were feeling)       Environmental or Psychosocial Events: challenging interpersonal relationships, ongoing abuse of substances  Protective Factors: Protective Factors: strong bond to family unit, community support, or employment, intact marriage or domestic partnership, responsibilities and duties to others, including pets and children, sense of importance of health and wellness, supportive ongoing medical and mental health care relationships, help seeking, able to access care without barriers    Does the patient have thoughts of harming others? Feels Like Hurting Others: no  Previous Attempt to Hurt Others: no  Is the patient engaging in sexually inappropriate behavior?: no    Is the patient engaging in sexually inappropriate behavior?  no        Mental Status Exam   Affect: Blunted  Appearance: Appropriate  Attention Span/Concentration: Attentive  Eye Contact: Engaged    Fund of Knowledge: Appropriate   Language /Speech Content: Fluent  Language /Speech Volume: Normal  Language /Speech Rate/Productions: Normal  Recent Memory: Intact  Remote Memory: Intact  Mood: Anxious  Orientation to Person: Yes   Orientation to Place: Yes  Orientation to Time of Day: Yes  Orientation to Date: Yes     Situation (Do they understand why they are here?): Yes  Psychomotor Behavior: Normal  Thought Content: Clear, Suicidal  Thought Form: Intact         Medication  Psychotropic medications:   Medication Orders - Psychiatric (From admission, onward)      Start     Dose/Rate Route Frequency Ordered Stop    09/26/24 1100  nicotine (NICORETTE) gum 2 mg         2 mg Buccal EVERY 1 HOUR PRN 09/26/24 1101      09/26/24 1048  hydrOXYzine HCl (ATARAX) tablet  mg          mg Oral 3 TIMES DAILY PRN 09/26/24 1049               Current Care Team  Patient Care Team:  No Ref-Primary, Physician as PCP - General  Dr. Christianne Lara as Psychiatrist    Diagnosis  Patient Active Problem List   Diagnosis Code    Depression F32.A    Marijuana dependence (H) F12.20    Pelvic pain in female R10.2    Psychosis (H) F29    Sepsis, unspecified organism (H) A41.9    Severe mixed bipolar I disorder with psychotic features (H) F31.64       Primary Problem This Admission  Active Hospital Problems    Severe mixed bipolar I disorder with psychotic features (H)        Clinical Summary and Substantiation of Recommendations     At this time it does appear that Pt would benefit from further observation and psychiatric stabilization via medication management and ED level therapeutic interventions, due to medication noncompliance, identified manic symptoms by Pt, and wanting services regarding ARIEL. Pt was not able to fully safety plan with writer. Pt does appear to be at higher risk of death by suicide accidental or intentional due to mental health hx and substance use sx.    Recommendation of Pt meeting with attending psychiatric provider to discuss medication management and engaging in ARIEL assessment. Assist with potentially scheduling outpatient therapy for Pt prior to anticipated discharge on 09/27/2024. If Pt is able to effectively safety plan and/or Pts sx improve it would be beneficial to pursue a less restrictive alternative than remaining in the hospital.      Patient coping skills attempted to reduce the crisis:  Voluntarily presented to the ED.    Disposition  Recommended disposition:  Rule 25/ARIEL Assessment, Observation, Individual Therapy, Medication Management        Reviewed case and recommendations with attending provider. Attending Name: NA       Attending concurs with disposition: no (Information from this assessment will be available for the attending psychiatric provider.)       Patient and/or validated legal guardian concurs with disposition:   yes       Final disposition:  observation    Legal status on admission: Voluntary/Patient has signed consent for treatment    Assessment Details   Total duration spent with the patient: 17 min     CPT code(s) utilized: Non-Billable    JAMES Brar, Psychotherapist  DEC - Triage & Transition Services  Callback: [phone]

## 2024-09-26 NOTE — CONSULTS
Met with patient to discuss possible ARIEL treatment options and to possibly complete an assessment. Patient states she is interested in Outpatient treatment but didn't feel up to doing the assessment at this time.  Patient requested information of where she could do the assessment in the community.  Below are 3 places she could complete an assessment and participate in Outpatient Treatment in her community(within 5 miles of her address.      [facility]  [address] [place] [address]  [phone]    [email or web address]  [address] [place] [address]  [phone]    [facility] [address] [place] [address]  [phone]    PHUC Laws on 9/26/2024 at 2:23 PM

## 2024-09-27 NOTE — PROGRESS NOTES
Discharge instructions reviewed with patient including follow-up care plan.  Reviewed safety plan and outpatient resources/appointments.Verbalized understanding of discharge instructions. Denies SI. All belongings which where brought into the hospital have been returned to patient. Escorted off the unit at 20:55 by staff. Discharged to home via private transportation.

## 2024-09-27 NOTE — ED PROVIDER NOTES
[facility] - Psychiatry  Combined Observation Note and Discharge Summary  [facility] Emergency Department  Observation Initiation Date: Sep 26, 2024    Jenniffer Young MRN: [id number]   Age: 29 year old YOB: 1995     History     Chief Complaint   Patient presents with    Mental Health Problem     HPI  Jenniffer Young is a 29 year old female with a past history notable for alcohol and cannabis use disorder and bipolar disorder who reported voluntarily to the emergency department with increasing functional deficits. She was cleared medically and transferred to [facility] for additional assessment and treatment planning. At the time of the assessment today 9/26/24, she is nearing 11 hours in emergency care.    Please see the [facility] assessment & reassessment (if available), ED physician notes and nursing notes for additional information and collateral content if available. All were reviewed prior to meeting with the patient. Pertinent content includes the following: reporting daily alcohol and marijuana use. She was assessed by the [facility] 8 hours ago who recommended an Observation stay with [facility]EL assessment.    On approach today, Jenniffer is interviewed in a conference room. She is actively engaged throughout the visit and appears to be a reliable informant. 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 She reports that she met with an LADC this afternoon and received resources for assessment and outpatient ARIEL treatment within 5 miles of her home. She denies active SI/HI, A/V hallucinations, delusions or paranoia. She is currently taking Haldol and lamotrigine and missed some doses this week, not > 3 days. We discussed some medication changes including to change from Haldol to Abilify or Rexulti. She has a psychiatric provider in place and is interested in following up with a therapist.  "She would like to return home this evening.    Past Medical History  Past Medical History:   Diagnosis Date    Anxiety disorder     No Comments Provided     Past Surgical History:   Procedure Laterality Date    ESOPHAGOSCOPY, GASTROSCOPY, DUODENOSCOPY (EGD), COMBINED      2010 - found \"bile\" in stomach.    OTHER SURGICAL HISTORY      [id number],NO PREVIOUS SURGERY    OTHER SURGICAL HISTORY      12/14/2015,24571.0,IL LAP DIAGNOSTIC ABDOMEN    TONSILLECTOMY      No Comments Provided     acetaminophen (TYLENOL) 500 MG tablet  albuterol (PROAIR HFA/PROVENTIL HFA/VENTOLIN HFA) 108 (90 BASE) MCG/ACT Inhaler  haloperidol (HALDOL) 2 MG tablet  hydrOXYzine HCl (ATARAX) 50 MG tablet  lamoTRIgine (LAMICTAL) 200 MG tablet  albuterol (PROVENTIL HFA) 108 (90 Base) MCG/ACT inhaler  fluticasone (FLOVENT HFA) 220 MCG/ACT inhaler  predniSONE (DELTASONE) 20 MG tablet      Allergies   Allergen Reactions    Oxycodone-Acetaminophen Nausea and Vomiting    Animal Dander Cough     Other reaction(s): Runny Nose  Nasal itching and congestion    Feathers Cough     Other reaction(s): Runny Nose  Itchy nose and nasal congestion.  Family history of feather allergy     Family History  No family history on file.  Social History   Social History     Tobacco Use    Smoking status: Some Days     Types: Vaping Device    Smokeless tobacco: Never   Substance Use Topics    Alcohol use: No     Alcohol/week: 0.0 standard drinks of alcohol     Comment: Alcoholic Drinks/day: occ    Drug use: Yes     Types: Marijuana     Comment: Drug use: Yes          Review of Systems  A medically appropriate review of systems was performed with pertinent positives and negatives noted in the HPI, and all other systems negative.    Physical Examination   BP: 125/80  Pulse: 70  Temp: 97.9  F (36.6  C)  Resp: 16  Height: 162.6 cm (5' 4\")  SpO2: 99 %    Physical Exam  General: Appears stated age.   Neuro: Alert and fully oriented. Extremities appear to demonstrate normal strength on " visual inspection.   Integumentary/Skin: no rash visualized, normal color    Psychiatric Examination   Appearance: awake, alert  Attitude:  cooperative  Eye Contact:  fair  Mood:  depressed  Affect:  mood congruent  Speech:  clear, coherent  Psychomotor Behavior:  no evidence of tardive dyskinesia, dystonia, or tics  Thought Process:  linear  Associations:  no loose associations  Thought Content:  no evidence of suicidal ideation or homicidal ideation and no evidence of psychotic thought  Insight:  good  Judgement:  fair  Oriented to:  time, person, and place  Attention Span and Concentration:  fair  Recent and Remote Memory:  intact  Language: able to name/identify objects without impairment  Fund of Knowledge: intact with awareness of current and past events    ED Course        Labs Ordered and Resulted from Time of ED Arrival to Time of ED Departure   URINE DRUG SCREEN PANEL - Abnormal       Result Value    Amphetamines Urine Screen Negative      Barbituates Urine Screen Negative      Benzodiazepine Urine Screen Negative      Cannabinoids Urine Screen Positive (*)     Cocaine Urine Screen Negative      Fentanyl Qual Urine Screen Negative      Opiates Urine Screen Negative      PCP Urine Screen Negative     HCG QUALITATIVE URINE - Normal    hCG Urine Qualitative Negative         Assessments & Plan (with Medical Decision Making)   Patient presenting with increasing bipolar 1 depression and ruminating thoughts in the presence of cannabis dependence and alcohol use disorder. She is interested in attending an outpatient substance use treatment program. She would like to discharge home and is able to safety plan. Nursing notes reviewed noting no acute issues.     I have reviewed the assessment completed by the [facility].     During the observation period, the patient did not require medications for agitation, and did not require restraints/seclusion for patient and/or provider safety.    The patient was found to have a  psychiatric condition that would benefit from an observation stay in the emergency department for further psychiatric stabilization and/or coordination of a safe disposition. The observation plan includes serial assessments of psychiatric condition, potential administration of medications if indicated, further disposition pending the patient's psychiatric course during the monitoring period.     Preliminary diagnosis:    ICD-10-CM    1. Bipolar 1 disorder (H)  F31.9       2. Mental health problem  F48.9       3. Alcohol abuse  F10.10       4. Marijuana dependence (H)  F12.20            Treatment Plan:  - Continue prior to admission medications to treat bipolar depression and anxiety.  - Encouraged to speak with her outpatient psychiatric provider regarding switching from Haldol to a SGA like Abilify or Rexulti.  -Medication education provided this visit includes, rationale for medication, importance of compliance, medication interactions, and common side effects.   -Supportive psychotherapy provided regarding patient's stressors and past mental health symptoms problem solving ways to improve overall wellness and cope with acute and chronic mental health symptoms.  -Follow up with referrals and appointments as scheduled.  - Discharge home.  - Return if symptoms worsen.      After the period in observation care, the patient's circumstances and mental state were safe for outpatient management. After counseling on the diagnosis, work-up, and treatment plan, the patient was discharged. Close follow-up with a psychiatrist and/or therapist was recommended and community psychiatric resources were provided. Patient is to return to the ED if any urgent or potentially life-threatening concerns.      At the time of discharge, the patient's acute suicide risk was determined to be low due to the following factors: Reduction in the intensity of mood/anxiety symptoms that preceded the admission, denial of suicidal thoughts, denies  feeling helpless or helpless, not currently under the influence of alcohol or illicit substances, denies experiencing command hallucinations, no immediate access to firearms. The patient's acute risk could be higher if noncompliant with their treatment plan, medications, follow-up appointments or using illicit substances or alcohol. Protective factors include: social supports, children, stable housing, employment, and a desire to work toward mental health and wellness.    --  RICHY Nevarez CNP   [facility] EMERGENCY DEPT  [facility] Unit         Adenike Owens APRN CNP  10/09/24 1906

## 2024-09-27 NOTE — ED NOTES
Pt has been resting on the recliner. She requested toiletries to clean up. Pt endorsed back pain and received PRN acetaminophen. Pt was up to eat dinner and watch tv, she performed ADL's and then went back to rest. Pt is not displaying any symptoms of withdrawal. She presents blunted in affect, calm in mood.

## 2024-09-28 ENCOUNTER — TELEPHONE (OUTPATIENT)
Dept: BEHAVIORAL HEALTH | Facility: CLINIC | Age: 29
End: 2024-09-28
Payer: COMMERCIAL